# Patient Record
Sex: FEMALE | Race: WHITE | Employment: OTHER | URBAN - METROPOLITAN AREA
[De-identification: names, ages, dates, MRNs, and addresses within clinical notes are randomized per-mention and may not be internally consistent; named-entity substitution may affect disease eponyms.]

---

## 2018-10-12 ENCOUNTER — HOSPITAL ENCOUNTER (OUTPATIENT)
Dept: GENERAL RADIOLOGY | Age: 83
Discharge: HOME | End: 2018-10-12
Payer: MEDICARE

## 2018-10-12 ENCOUNTER — HOSPITAL ENCOUNTER (OUTPATIENT)
Dept: PREADMISSION TESTING | Age: 83
Discharge: HOME | End: 2018-10-12
Payer: MEDICARE

## 2018-10-12 VITALS
DIASTOLIC BLOOD PRESSURE: 74 MMHG | HEIGHT: 57 IN | BODY MASS INDEX: 28.05 KG/M2 | SYSTOLIC BLOOD PRESSURE: 147 MMHG | WEIGHT: 130 LBS | HEART RATE: 96 BPM | TEMPERATURE: 97.9 F

## 2018-10-12 DIAGNOSIS — Z01.818 PRE-OP TESTING: ICD-10-CM

## 2018-10-12 LAB
ALBUMIN SERPL-MCNC: 3.5 G/DL (ref 3.5–5)
ALBUMIN/GLOB SERPL: 1.1 {RATIO} (ref 1.1–2.2)
ALP SERPL-CCNC: 84 U/L (ref 45–117)
ALT SERPL-CCNC: 24 U/L (ref 12–78)
ANION GAP SERPL CALC-SCNC: 6 MMOL/L (ref 5–15)
AST SERPL-CCNC: 19 U/L (ref 15–37)
BASOPHILS # BLD: 0.1 K/UL (ref 0–0.1)
BASOPHILS NFR BLD: 1 % (ref 0–1)
BILIRUB SERPL-MCNC: 0.4 MG/DL (ref 0.2–1)
BUN SERPL-MCNC: 22 MG/DL (ref 6–20)
BUN/CREAT SERPL: 18 (ref 12–20)
CALCIUM SERPL-MCNC: 9.4 MG/DL (ref 8.5–10.1)
CHLORIDE SERPL-SCNC: 105 MMOL/L (ref 97–108)
CO2 SERPL-SCNC: 30 MMOL/L (ref 21–32)
CREAT SERPL-MCNC: 1.24 MG/DL (ref 0.55–1.02)
DIFFERENTIAL METHOD BLD: ABNORMAL
EOSINOPHIL # BLD: 0.1 K/UL (ref 0–0.4)
EOSINOPHIL NFR BLD: 2 % (ref 0–7)
ERYTHROCYTE [DISTWIDTH] IN BLOOD BY AUTOMATED COUNT: 13.7 % (ref 11.5–14.5)
GLOBULIN SER CALC-MCNC: 3.1 G/DL (ref 2–4)
GLUCOSE SERPL-MCNC: 91 MG/DL (ref 65–100)
HCT VFR BLD AUTO: 35.9 % (ref 35–47)
HGB BLD-MCNC: 11.2 G/DL (ref 11.5–16)
IMM GRANULOCYTES # BLD: 0.1 K/UL (ref 0–0.04)
IMM GRANULOCYTES NFR BLD AUTO: 1 % (ref 0–0.5)
LYMPHOCYTES # BLD: 1.8 K/UL (ref 0.8–3.5)
LYMPHOCYTES NFR BLD: 21 % (ref 12–49)
MCH RBC QN AUTO: 31.4 PG (ref 26–34)
MCHC RBC AUTO-ENTMCNC: 31.2 G/DL (ref 30–36.5)
MCV RBC AUTO: 100.6 FL (ref 80–99)
MONOCYTES # BLD: 1 K/UL (ref 0–1)
MONOCYTES NFR BLD: 12 % (ref 5–13)
NEUTS SEG # BLD: 5.3 K/UL (ref 1.8–8)
NEUTS SEG NFR BLD: 64 % (ref 32–75)
NRBC # BLD: 0 K/UL (ref 0–0.01)
NRBC BLD-RTO: 0 PER 100 WBC
PLATELET # BLD AUTO: 256 K/UL (ref 150–400)
PMV BLD AUTO: 10.6 FL (ref 8.9–12.9)
POTASSIUM SERPL-SCNC: 4.7 MMOL/L (ref 3.5–5.1)
PROT SERPL-MCNC: 6.6 G/DL (ref 6.4–8.2)
RBC # BLD AUTO: 3.57 M/UL (ref 3.8–5.2)
SODIUM SERPL-SCNC: 141 MMOL/L (ref 136–145)
WBC # BLD AUTO: 8.2 K/UL (ref 3.6–11)

## 2018-10-12 PROCEDURE — 71046 X-RAY EXAM CHEST 2 VIEWS: CPT

## 2018-10-12 PROCEDURE — 85025 COMPLETE CBC W/AUTO DIFF WBC: CPT | Performed by: SPECIALIST

## 2018-10-12 PROCEDURE — 80053 COMPREHEN METABOLIC PANEL: CPT | Performed by: SPECIALIST

## 2018-10-12 PROCEDURE — 36415 COLL VENOUS BLD VENIPUNCTURE: CPT | Performed by: SPECIALIST

## 2018-10-12 RX ORDER — FOLIC ACID 1 MG/1
TABLET ORAL DAILY
COMMUNITY

## 2018-10-12 RX ORDER — CYANOCOBALAMIN 1000 UG/ML
1000 INJECTION, SOLUTION INTRAMUSCULAR; SUBCUTANEOUS
COMMUNITY

## 2018-10-12 RX ORDER — GLUCOSAMINE HCL 500 MG
TABLET ORAL
COMMUNITY

## 2018-10-12 RX ORDER — AA/PROT/LYSINE/METHIO/VIT C/B6 50-12.5 MG
TABLET ORAL
COMMUNITY

## 2018-10-12 NOTE — PERIOP NOTES
PATIENT GIVEN WRITTEN INSTRUCTIONS TO GO TO THE IMAGING CENTER/CANCER CENTER [address] [facility] [address] TO HAVE CHEST XRAY COMPLETED. Patient verbalizes understanding of preoperative instructions:  NPO AFTER MN .

## 2018-10-23 ENCOUNTER — ANESTHESIA EVENT (OUTPATIENT)
Dept: SURGERY | Age: 83
End: 2018-10-23
Payer: MEDICARE

## 2018-10-23 ENCOUNTER — HOSPITAL ENCOUNTER (OUTPATIENT)
Age: 83
Setting detail: OUTPATIENT SURGERY
Discharge: HOME | End: 2018-10-23
Attending: SPECIALIST | Admitting: SPECIALIST
Payer: MEDICARE

## 2018-10-23 ENCOUNTER — ANESTHESIA (OUTPATIENT)
Dept: SURGERY | Age: 83
End: 2018-10-23
Payer: MEDICARE

## 2018-10-23 VITALS
BODY MASS INDEX: 28.05 KG/M2 | WEIGHT: 130 LBS | OXYGEN SATURATION: 95 % | HEART RATE: 72 BPM | TEMPERATURE: 97.3 F | DIASTOLIC BLOOD PRESSURE: 54 MMHG | SYSTOLIC BLOOD PRESSURE: 152 MMHG | RESPIRATION RATE: 12 BRPM | HEIGHT: 57 IN

## 2018-10-23 DIAGNOSIS — I10 HYPERTENSION, UNSPECIFIED TYPE: Primary | ICD-10-CM

## 2018-10-23 PROCEDURE — 77030008570 HC TBNG SUC IRR GRAC -B: Performed by: SPECIALIST

## 2018-10-23 PROCEDURE — 74011250637 HC RX REV CODE- 250/637: Performed by: SPECIALIST

## 2018-10-23 PROCEDURE — 74011000272 HC RX REV CODE- 272: Performed by: SPECIALIST

## 2018-10-23 PROCEDURE — 77030018836 HC SOL IRR NACL ICUM -A: Performed by: SPECIALIST

## 2018-10-23 PROCEDURE — 74011250636 HC RX REV CODE- 250/636

## 2018-10-23 PROCEDURE — 76210000021 HC REC RM PH II 0.5 TO 1 HR: Performed by: SPECIALIST

## 2018-10-23 PROCEDURE — 74011000250 HC RX REV CODE- 250

## 2018-10-23 PROCEDURE — 77030032490 HC SLV COMPR SCD KNE COVD -B: Performed by: SPECIALIST

## 2018-10-23 PROCEDURE — 76060000033 HC ANESTHESIA 1 TO 1.5 HR: Performed by: SPECIALIST

## 2018-10-23 PROCEDURE — 77030020782 HC GWN BAIR PAWS FLX 3M -B

## 2018-10-23 PROCEDURE — 76010000149 HC OR TIME 1 TO 1.5 HR: Performed by: SPECIALIST

## 2018-10-23 PROCEDURE — 77030002974 HC SUT PLN J&J -A: Performed by: SPECIALIST

## 2018-10-23 PROCEDURE — 77030018846 HC SOL IRR STRL H20 ICUM -A: Performed by: SPECIALIST

## 2018-10-23 PROCEDURE — 76210000063 HC OR PH I REC FIRST 0.5 HR: Performed by: SPECIALIST

## 2018-10-23 PROCEDURE — 77030026438 HC STYL ET INTUB CARD -A: Performed by: ANESTHESIOLOGY

## 2018-10-23 PROCEDURE — 77030006689 HC BLD OPHTH BVR BD -A: Performed by: SPECIALIST

## 2018-10-23 PROCEDURE — 74011000250 HC RX REV CODE- 250: Performed by: SPECIALIST

## 2018-10-23 PROCEDURE — 74011250636 HC RX REV CODE- 250/636: Performed by: ANESTHESIOLOGY

## 2018-10-23 PROCEDURE — 77030008684 HC TU ET CUF COVD -B: Performed by: ANESTHESIOLOGY

## 2018-10-23 PROCEDURE — 77030006932 HC BLD TYMP BVR BD -B: Performed by: SPECIALIST

## 2018-10-23 RX ORDER — SODIUM CHLORIDE 9 MG/ML
1000 INJECTION, SOLUTION INTRAVENOUS CONTINUOUS
Status: DISCONTINUED | OUTPATIENT
Start: 2018-10-23 | End: 2018-10-23 | Stop reason: HOSPADM

## 2018-10-23 RX ORDER — FENTANYL CITRATE 50 UG/ML
INJECTION, SOLUTION INTRAMUSCULAR; INTRAVENOUS AS NEEDED
Status: DISCONTINUED | OUTPATIENT
Start: 2018-10-23 | End: 2018-10-23 | Stop reason: HOSPADM

## 2018-10-23 RX ORDER — CIPROFLOXACIN HYDROCHLORIDE 3.5 MG/ML
5 SOLUTION/ DROPS TOPICAL 2 TIMES DAILY
Qty: 10 ML | Refills: 5 | Status: SHIPPED | OUTPATIENT
Start: 2018-10-23 | End: 2019-12-10

## 2018-10-23 RX ORDER — EPINEPHRINE NASAL SOLUTION 1 MG/ML
SOLUTION NASAL AS NEEDED
Status: DISCONTINUED | OUTPATIENT
Start: 2018-10-23 | End: 2018-10-23 | Stop reason: HOSPADM

## 2018-10-23 RX ORDER — SODIUM CHLORIDE, SODIUM LACTATE, POTASSIUM CHLORIDE, CALCIUM CHLORIDE 600; 310; 30; 20 MG/100ML; MG/100ML; MG/100ML; MG/100ML
INJECTION, SOLUTION INTRAVENOUS
Status: DISCONTINUED | OUTPATIENT
Start: 2018-10-23 | End: 2018-10-23 | Stop reason: HOSPADM

## 2018-10-23 RX ORDER — MIDAZOLAM HYDROCHLORIDE 1 MG/ML
1 INJECTION, SOLUTION INTRAMUSCULAR; INTRAVENOUS AS NEEDED
Status: DISCONTINUED | OUTPATIENT
Start: 2018-10-23 | End: 2018-10-23 | Stop reason: HOSPADM

## 2018-10-23 RX ORDER — SODIUM CHLORIDE 0.9 % (FLUSH) 0.9 %
5-10 SYRINGE (ML) INJECTION AS NEEDED
Status: DISCONTINUED | OUTPATIENT
Start: 2018-10-23 | End: 2018-10-23 | Stop reason: HOSPADM

## 2018-10-23 RX ORDER — EPINEPHRINE NASAL SOLUTION 1 MG/ML
2 SOLUTION NASAL
Status: DISCONTINUED | OUTPATIENT
Start: 2018-10-23 | End: 2018-10-23 | Stop reason: HOSPADM

## 2018-10-23 RX ORDER — OXYCODONE AND ACETAMINOPHEN 5; 325 MG/1; MG/1
1 TABLET ORAL AS NEEDED
Status: DISCONTINUED | OUTPATIENT
Start: 2018-10-23 | End: 2018-10-23 | Stop reason: HOSPADM

## 2018-10-23 RX ORDER — BACITRACIN 500 [USP'U]/G
OINTMENT TOPICAL
Status: DISCONTINUED | OUTPATIENT
Start: 2018-10-23 | End: 2018-10-23 | Stop reason: HOSPADM

## 2018-10-23 RX ORDER — ONDANSETRON 2 MG/ML
4 INJECTION INTRAMUSCULAR; INTRAVENOUS AS NEEDED
Status: DISCONTINUED | OUTPATIENT
Start: 2018-10-23 | End: 2018-10-23 | Stop reason: HOSPADM

## 2018-10-23 RX ORDER — LIDOCAINE HYDROCHLORIDE 20 MG/ML
INJECTION, SOLUTION EPIDURAL; INFILTRATION; INTRACAUDAL; PERINEURAL AS NEEDED
Status: DISCONTINUED | OUTPATIENT
Start: 2018-10-23 | End: 2018-10-23 | Stop reason: HOSPADM

## 2018-10-23 RX ORDER — CLINDAMYCIN PHOSPHATE 900 MG/50ML
900 INJECTION INTRAVENOUS
Status: DISCONTINUED | OUTPATIENT
Start: 2018-10-23 | End: 2018-10-23 | Stop reason: HOSPADM

## 2018-10-23 RX ORDER — CLINDAMYCIN HYDROCHLORIDE 150 MG/1
300 CAPSULE ORAL 3 TIMES DAILY
Qty: 30 CAP | Refills: 0 | Status: SHIPPED | OUTPATIENT
Start: 2018-10-23 | End: 2018-10-28

## 2018-10-23 RX ORDER — FENTANYL CITRATE 50 UG/ML
25 INJECTION, SOLUTION INTRAMUSCULAR; INTRAVENOUS
Status: DISCONTINUED | OUTPATIENT
Start: 2018-10-23 | End: 2018-10-23 | Stop reason: HOSPADM

## 2018-10-23 RX ORDER — LIDOCAINE HYDROCHLORIDE AND EPINEPHRINE 10; 10 MG/ML; UG/ML
1.5 INJECTION, SOLUTION INFILTRATION; PERINEURAL ONCE
Status: DISCONTINUED | OUTPATIENT
Start: 2018-10-23 | End: 2018-10-23 | Stop reason: HOSPADM

## 2018-10-23 RX ORDER — SODIUM CHLORIDE, SODIUM LACTATE, POTASSIUM CHLORIDE, CALCIUM CHLORIDE 600; 310; 30; 20 MG/100ML; MG/100ML; MG/100ML; MG/100ML
125 INJECTION, SOLUTION INTRAVENOUS CONTINUOUS
Status: DISCONTINUED | OUTPATIENT
Start: 2018-10-23 | End: 2018-10-23 | Stop reason: HOSPADM

## 2018-10-23 RX ORDER — OFLOXACIN 3 MG/ML
SOLUTION/ DROPS OPHTHALMIC AS NEEDED
Status: DISCONTINUED | OUTPATIENT
Start: 2018-10-23 | End: 2018-10-23 | Stop reason: HOSPADM

## 2018-10-23 RX ORDER — PROPOFOL 10 MG/ML
INJECTION, EMULSION INTRAVENOUS AS NEEDED
Status: DISCONTINUED | OUTPATIENT
Start: 2018-10-23 | End: 2018-10-23 | Stop reason: HOSPADM

## 2018-10-23 RX ORDER — FENTANYL CITRATE 50 UG/ML
50 INJECTION, SOLUTION INTRAMUSCULAR; INTRAVENOUS AS NEEDED
Status: DISCONTINUED | OUTPATIENT
Start: 2018-10-23 | End: 2018-10-23 | Stop reason: HOSPADM

## 2018-10-23 RX ORDER — SODIUM CHLORIDE 0.9 % (FLUSH) 0.9 %
5-10 SYRINGE (ML) INJECTION EVERY 8 HOURS
Status: DISCONTINUED | OUTPATIENT
Start: 2018-10-23 | End: 2018-10-23 | Stop reason: HOSPADM

## 2018-10-23 RX ORDER — DEXAMETHASONE SODIUM PHOSPHATE 4 MG/ML
INJECTION, SOLUTION INTRA-ARTICULAR; INTRALESIONAL; INTRAMUSCULAR; INTRAVENOUS; SOFT TISSUE AS NEEDED
Status: DISCONTINUED | OUTPATIENT
Start: 2018-10-23 | End: 2018-10-23 | Stop reason: HOSPADM

## 2018-10-23 RX ORDER — MORPHINE SULFATE 10 MG/ML
2 INJECTION, SOLUTION INTRAMUSCULAR; INTRAVENOUS
Status: DISCONTINUED | OUTPATIENT
Start: 2018-10-23 | End: 2018-10-23 | Stop reason: HOSPADM

## 2018-10-23 RX ORDER — HYDROCODONE BITARTRATE AND ACETAMINOPHEN 5; 325 MG/1; MG/1
1-2 TABLET ORAL
Qty: 20 TAB | Refills: 0 | Status: SHIPPED | OUTPATIENT
Start: 2018-10-23

## 2018-10-23 RX ORDER — ONDANSETRON 2 MG/ML
INJECTION INTRAMUSCULAR; INTRAVENOUS AS NEEDED
Status: DISCONTINUED | OUTPATIENT
Start: 2018-10-23 | End: 2018-10-23 | Stop reason: HOSPADM

## 2018-10-23 RX ORDER — MIDAZOLAM HYDROCHLORIDE 1 MG/ML
0.5 INJECTION, SOLUTION INTRAMUSCULAR; INTRAVENOUS
Status: DISCONTINUED | OUTPATIENT
Start: 2018-10-23 | End: 2018-10-23 | Stop reason: HOSPADM

## 2018-10-23 RX ORDER — LIDOCAINE HYDROCHLORIDE AND EPINEPHRINE 10; 10 MG/ML; UG/ML
INJECTION, SOLUTION INFILTRATION; PERINEURAL AS NEEDED
Status: DISCONTINUED | OUTPATIENT
Start: 2018-10-23 | End: 2018-10-23 | Stop reason: HOSPADM

## 2018-10-23 RX ORDER — ESMOLOL HYDROCHLORIDE 10 MG/ML
INJECTION INTRAVENOUS AS NEEDED
Status: DISCONTINUED | OUTPATIENT
Start: 2018-10-23 | End: 2018-10-23 | Stop reason: HOSPADM

## 2018-10-23 RX ORDER — DIPHENHYDRAMINE HYDROCHLORIDE 50 MG/ML
12.5 INJECTION, SOLUTION INTRAMUSCULAR; INTRAVENOUS AS NEEDED
Status: DISCONTINUED | OUTPATIENT
Start: 2018-10-23 | End: 2018-10-23 | Stop reason: HOSPADM

## 2018-10-23 RX ORDER — ROCURONIUM BROMIDE 10 MG/ML
INJECTION, SOLUTION INTRAVENOUS AS NEEDED
Status: DISCONTINUED | OUTPATIENT
Start: 2018-10-23 | End: 2018-10-23 | Stop reason: HOSPADM

## 2018-10-23 RX ORDER — SODIUM CHLORIDE 9 MG/ML
50 INJECTION, SOLUTION INTRAVENOUS CONTINUOUS
Status: DISCONTINUED | OUTPATIENT
Start: 2018-10-23 | End: 2018-10-23 | Stop reason: HOSPADM

## 2018-10-23 RX ORDER — SUCCINYLCHOLINE CHLORIDE 20 MG/ML
INJECTION INTRAMUSCULAR; INTRAVENOUS AS NEEDED
Status: DISCONTINUED | OUTPATIENT
Start: 2018-10-23 | End: 2018-10-23 | Stop reason: HOSPADM

## 2018-10-23 RX ORDER — LIDOCAINE HYDROCHLORIDE 10 MG/ML
0.1 INJECTION, SOLUTION EPIDURAL; INFILTRATION; INTRACAUDAL; PERINEURAL AS NEEDED
Status: DISCONTINUED | OUTPATIENT
Start: 2018-10-23 | End: 2018-10-23 | Stop reason: HOSPADM

## 2018-10-23 RX ORDER — SPIRONOLACTONE 25 MG/1
12.5 TABLET ORAL DAILY
COMMUNITY

## 2018-10-23 RX ORDER — BACITRACIN 500 [USP'U]/G
OINTMENT TOPICAL AS NEEDED
Status: DISCONTINUED | OUTPATIENT
Start: 2018-10-23 | End: 2018-10-23 | Stop reason: HOSPADM

## 2018-10-23 RX ADMIN — ROCURONIUM BROMIDE 10 MG: 10 INJECTION, SOLUTION INTRAVENOUS at 07:34

## 2018-10-23 RX ADMIN — SODIUM CHLORIDE, POTASSIUM CHLORIDE, SODIUM LACTATE AND CALCIUM CHLORIDE 125 ML/HR: 600; 310; 30; 20 INJECTION, SOLUTION INTRAVENOUS at 07:20

## 2018-10-23 RX ADMIN — ONDANSETRON 4 MG: 2 INJECTION INTRAMUSCULAR; INTRAVENOUS at 08:09

## 2018-10-23 RX ADMIN — FENTANYL CITRATE 50 MCG: 50 INJECTION, SOLUTION INTRAMUSCULAR; INTRAVENOUS at 07:23

## 2018-10-23 RX ADMIN — SUCCINYLCHOLINE CHLORIDE 80 MG: 20 INJECTION INTRAMUSCULAR; INTRAVENOUS at 07:34

## 2018-10-23 RX ADMIN — PROPOFOL 80 MG: 10 INJECTION, EMULSION INTRAVENOUS at 07:34

## 2018-10-23 RX ADMIN — FENTANYL CITRATE 25 MCG: 50 INJECTION, SOLUTION INTRAMUSCULAR; INTRAVENOUS at 07:32

## 2018-10-23 RX ADMIN — ESMOLOL HYDROCHLORIDE 10 MG: 10 INJECTION INTRAVENOUS at 08:17

## 2018-10-23 RX ADMIN — FENTANYL CITRATE 25 MCG: 50 INJECTION, SOLUTION INTRAMUSCULAR; INTRAVENOUS at 07:30

## 2018-10-23 RX ADMIN — ESMOLOL HYDROCHLORIDE 10 MG: 10 INJECTION INTRAVENOUS at 08:20

## 2018-10-23 RX ADMIN — DEXAMETHASONE SODIUM PHOSPHATE 8 MG: 4 INJECTION, SOLUTION INTRA-ARTICULAR; INTRALESIONAL; INTRAMUSCULAR; INTRAVENOUS; SOFT TISSUE at 07:34

## 2018-10-23 RX ADMIN — ESMOLOL HYDROCHLORIDE 10 MG: 10 INJECTION INTRAVENOUS at 08:19

## 2018-10-23 RX ADMIN — LIDOCAINE HYDROCHLORIDE 60 MG: 20 INJECTION, SOLUTION EPIDURAL; INFILTRATION; INTRACAUDAL; PERINEURAL at 07:34

## 2018-10-23 RX ADMIN — ESMOLOL HYDROCHLORIDE 10 MG: 10 INJECTION INTRAVENOUS at 08:22

## 2018-10-23 RX ADMIN — SODIUM CHLORIDE, SODIUM LACTATE, POTASSIUM CHLORIDE, CALCIUM CHLORIDE: 600; 310; 30; 20 INJECTION, SOLUTION INTRAVENOUS at 07:34

## 2018-10-23 RX ADMIN — ESMOLOL HYDROCHLORIDE 10 MG: 10 INJECTION INTRAVENOUS at 08:21

## 2018-10-23 NOTE — OP NOTES
OPERATIVE REPORT     PREOPERATIVE DIAGNOSIS: Right chronic otitis media. POSTOPERATIVE DIAGNOSIS: Right chronic otitis media. OPERATIVE PROCEDURES:   1. Right EnduralTympanoplasty   2. Cartilage graft through a separate incision. 3. Facial nerve monitoring x1 hour. 4. Placement of facial nerve electrodes. 5. Microdissection. SURGEON: Razia Berry MD     ESTIMATED BLOOD LOSS: 5 mL. SPECIMEN: None    ANESTHESIA: General.     COMPLICATIONS: None. Implant : none     FINDINGS:     1. Normal Ossicular chain  2. Normal middle ear space. 3.  50% posterior perforation    DESCRIPTION OF PROCEDURE:     After the patient received informed consent, the patient  was taken to the operating room. The patient was kept in the supine   position, was prepped and draped in the usual fashion. After the   administration of general anesthesia, the table was turned 180 degrees away   from the neutral position. The operating microscope was used for the entire case. 10 cc lidocaine with epinephrine was injected into the right pre and post auricular sites. The Right ear was   examined A tragal incision was made. Deeper layer of tissue was incised and   a 1 x 1 cm piece of tragal cartilage was harvested and placed on a Garland   block. This incision was then closed using a chromic stitch. The eardrum was visualized and 50% posterior perforation and the edges were revitalized using sharp dissection. Next, using a   speculum lowe, a tympanomeatal flap was constructed and raised. The   middle ear was entered. The chorda tympani was identified and    preserved. The middle ear was entered. Next, 30 minutes took place toward dissecting the tympanomeatal flap and   preserving the chorda tympani nerve. The ossicular chain was palpated and found to be mobile.  The cartilage graft, which was harvested earlier, was then placed lateral to a bed of cipro gel foam that was placed in the middle ear and closed the perforation. The external auditory canal was then packed with Ciprodex and Gelfoam after the tympanomeatal flap was reapproximated. All counts were correct at the end of the case. The patient tolerated the procedure well and went to PACU   in stable condition.        Munira Luna MD   cc: Alia Lee MD

## 2018-10-23 NOTE — BRIEF OP NOTE
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 
Circ-1: Kaden Zayas RN Scrub RN-1: Lora Rodrigues RN Event Time In Time Out Incision Start 8713 Incision Close 0813 Anesthesia: General  
Estimated Blood Loss: 2 cc Specimens: * No specimens in log * Findings: perforation Complications: none Implants: * No implants in log *

## 2018-10-23 NOTE — DISCHARGE INSTRUCTIONS
[facility]  POSTOPERATIVE INSTRUCTIONS  FOR PATIENTS UNDERGOING  CHRONIC EAR, MIDDLE EAR OR STAPES SURGERY    1. Do not blow your nose for three weeks following surgery. If you sneeze or cough do so with your mouth open. 2. Avoid any heavy lifting (over 10 lbs.), straining or bending for three weeks following surgery. 3. Keep your head elevated as much as possible x 48 hours . Sleep and rest on two to three pillows if possible. 4. Do not get water in your ear. If showering or washing your hair place a piece of cotton coated in Vaseline in the ear canal to seal it. If there is a separate incision keep this dry x 48 hours. 5. If you wear glasses either remove the arm on the operated side or make certain that it does not rest on the incision behind your ear for one week. 6. Beginning one day after surgery try to leave the cotton out of our ear as much as possible unless there is significant drainage. 7. Some drainage from your ear canal may occur after surgery. If there is a separate incision some drainage may occur from this area also. If the drainage is profuse or develops a foul odor please call. 8. Popping sounds, a plugged sensation, ringing or fluctuating hearing may be noticed in the ear during the healing. 9. Avoid travel by air for three weeks following surgery. 10. If you should notice any swelling, redness or excessive pain please call. 11. Some dizziness may occur after surgery. If it becomes severe or is associated with nausea or vomiting please call. 12. Please call The [address] [place] [address] [place]. to make an appointment to be seen 7-10 days after the time of your surgery unless stated otherwise by your physician. I understand and acknowledge receipt of the instructions indicated above.                                                                                                                                            Physician's or R.N.'s Signature Date/Time                                                                                                                                           Patient or Representative Signature                                                          Date/Time          [address] M.D.  [address]  [phone]    ______________________________________________________________________    Anesthesia Discharge Instructions    After general anesthesia or intervenous sedation, for 24 hours or while taking prescription Narcotics:  · Limit your activities  · Do not drive or operate hazardous machinery  · If you have not urinated within 8 hours after discharge, please contact your surgeon on call. · Do not make important personal or business decisions  · Do not drink alcoholic beverages    Report the following to your surgeon:  · Excessive pain, swelling, redness or odor of or around the surgical area  · Temperature over 100.5 degrees  · Nausea and vomiting lasting longer than 4 hours or if unable to take medication  · Any signs of decreased circulation or nerve impairment to extremity:  Change in color, persistent numbness, tingling, coldness or increased pain.   · Any questions

## 2018-10-23 NOTE — ANESTHESIA PREPROCEDURE EVALUATION
Anesthetic History No history of anesthetic complications Review of Systems / Medical History Patient summary reviewed, nursing notes reviewed and pertinent labs reviewed Pulmonary Within defined limits Sleep apnea Neuro/Psych Within defined limits Cardiovascular Within defined limits Dysrhythmias GI/Hepatic/Renal 
Within defined limits GERD Endo/Other Within defined limits Hypothyroidism Arthritis Other Findings Physical Exam 
 
Airway Mallampati: II 
TM Distance: > 6 cm Neck ROM: normal range of motion Mouth opening: Normal 
 
 Cardiovascular Regular rate and rhythm,  S1 and S2 normal,  no murmur, click, rub, or gallop Dental 
No notable dental hx Pulmonary Breath sounds clear to auscultation Abdominal 
GI exam deferred Other Findings Anesthetic Plan ASA: 3 Anesthesia type: general 
 
 
 
 
Induction: Intravenous Anesthetic plan and risks discussed with: Patient

## 2018-10-23 NOTE — ANESTHESIA POSTPROCEDURE EVALUATION
Procedure(s): RIGHT TYMPANOPLASTY, CARTILAGE GRAFT. Anesthesia Post Evaluation Multimodal analgesia: multimodal analgesia used between 6 hours prior to anesthesia start to PACU discharge Patient location during evaluation: PACU Patient participation: complete - patient participated Level of consciousness: awake Pain management: adequate Airway patency: patent Anesthetic complications: no 
Cardiovascular status: acceptable Respiratory status: acceptable Hydration status: acceptable Visit Vitals /56 Pulse 70 Temp 36.4 °C (97.6 °F) Resp 12 Ht 4' 9\" (1.448 m) Wt 59 kg (130 lb 0 oz) SpO2 97% BMI 28.13 kg/m²

## 2018-10-23 NOTE — ROUTINE PROCESS
Patient: Karlos Washington MRN: [id number]  SSN: [id number] YOB: 1927  Age: 80 y.o. Sex: female Patient is status post Procedure(s): RIGHT TYMPANOPLASTY, CARTILAGE GRAFT. Surgeon(s) and Role: Emiliana Ortiz MD - Primary Local/Dose/Irrigation:  10 ml 1% lidocaine with epinephrine was injected into patient's right ear Saline irrigation to sterile field. Peripheral IV 10/23/18 Anterior; Left Antecubital (Active) Site Assessment Clean, dry, & intact 10/23/2018  7:24 AM  
Phlebitis Assessment 0 10/23/2018  7:24 AM  
Infiltration Assessment 0 10/23/2018  7:24 AM  
Dressing Status Clean, dry, & intact; New 10/23/2018  7:24 AM  
Dressing Type Tape;Transparent 10/23/2018  7:24 AM  
Hub Color/Line Status Blue; Infusing 10/23/2018  7:24 AM  
                 
 
 
 
Dressing/Packing:  Wound Elbow Right-DRESSING TYPE: Cotton ball(s) (10/23/18 0816) Splint/Cast:  ] Other:  Patient has a wound on her write wrist, band-aid on left forearm, and dressing on her left knee prior to surgery Patient's glasses were sent with her to recovery 12:43 PM 
COMPUTER DOCUMENTED AN ERROR: WOUND WAS A RIGHT EAR NOT RIGHT ELBOW

## 2018-10-23 NOTE — PERIOP NOTES
7:52 AM 
UNABLE TO SPEAK TO PATIENT'S SON (ADAM) TO UPDATE HIM ON PATIENT'S SURGICAL STATUS. THIS NURSE LEFT A MESSAGE WITH THE SURGICAL WAITING AREA VOLUNTEER.

## 2019-12-03 ENCOUNTER — HOSPITAL ENCOUNTER (OUTPATIENT)
Dept: WOUND CARE | Age: 84
Discharge: HOME | End: 2019-12-03
Payer: MEDICARE

## 2019-12-03 VITALS
BODY MASS INDEX: 28.13 KG/M2 | SYSTOLIC BLOOD PRESSURE: 177 MMHG | RESPIRATION RATE: 15 BRPM | HEIGHT: 58 IN | DIASTOLIC BLOOD PRESSURE: 74 MMHG | HEART RATE: 95 BPM | TEMPERATURE: 96.6 F | WEIGHT: 134 LBS

## 2019-12-03 PROBLEM — L97.219 VARICOSE VEINS OF RIGHT LOWER EXTREMITY WITH ULCER OF CALF (HCC): Status: ACTIVE | Noted: 2019-12-03

## 2019-12-03 PROBLEM — I87.2 PERIPHERAL VENOUS INSUFFICIENCY: Status: ACTIVE | Noted: 2019-12-03

## 2019-12-03 PROBLEM — I83.012 VARICOSE VEINS OF RIGHT LOWER EXTREMITY WITH ULCER OF CALF (HCC): Status: ACTIVE | Noted: 2019-12-03

## 2019-12-03 PROCEDURE — 11042 DBRDMT SUBQ TIS 1ST 20SQCM/<: CPT

## 2019-12-03 PROCEDURE — 74011000250 HC RX REV CODE- 250: Performed by: PODIATRIST

## 2019-12-03 RX ORDER — METHSCOPOLAMINE BROMIDE 2.5 MG/1
TABLET ORAL
COMMUNITY

## 2019-12-03 RX ORDER — CEFUROXIME AXETIL 500 MG/1
500 TABLET ORAL 2 TIMES DAILY
COMMUNITY
Start: 2019-12-02 | End: 2019-12-12

## 2019-12-03 RX ADMIN — Medication: at 09:16

## 2019-12-03 NOTE — WOUND CARE
Wound Center History and Physical 
 
Subjective: Chief Complaint: 
Lyn Graham is a @AGE female who presents with R lower leg lateral wound of months months duration. Referred by:  Sadia Issa 
 
HPI:  
Wound caused by: acute injury due to blister or hitting leg Current wound care: 
Offloading wound: no Appetite: good Wound associated pain: moderate Diabetic: - 
Smoker: - 
ROS: no N/V, no T/chills; no local rash Past Medical History:  
Diagnosis Date  Arrhythmia H/O A-FIB  Arthritis  Cancer [facility])   
 h/o skin cancer on neck, forehead, L and R leg  CKD (chronic kidney disease), stage II   
 Corkscrew esophagus  GERD (gastroesophageal reflux disease)  Heart failure ([facility] [address]  Hyperlipidemia  Hypertension  Hypothyroidism  Ill-defined condition 2015 RIGHT EAR TUBE MILD HEARING LOSS  Sleep apnea WEARS GUARD Past Surgical History:  
Procedure Laterality Date  HX GI    
 1983 gallbladder removed  HX GYN    
 tubal ligation [id number]  HX GYN    
 2009 ovary cyst removed  HX GYN    
 D&C  
 HX HEENT    
 2012 L eye cataract removed  HX HYSTERECTOMY  2009  HX ORTHOPAEDIC    
 R foot surgery  HX OTHER SURGICAL    
 1984 neck cyst removed  HX OTHER SURGICAL    
 1992 cystocele and rectocele  HX OTHER SURGICAL    
 1991 plastic surgery for L and R leg skin cancer  HX PACEMAKER    
 HX UROLOGICAL    
 bladder lift 1961 Family History Problem Relation Age of Onset  Hypertension Mother  Heart Disease Father Social History Tobacco Use  Smoking status: Never Smoker  Smokeless tobacco: Never Used Substance Use Topics  Alcohol use: No  
   
Prior to Admission medications Medication Sig Start Date End Date Taking? Authorizing Provider  
amitriptyline HCl (AMITRIPTYLINE PO) Take  by mouth once. Yes Provider, Historical  
methscopolamine (PAMINE) 2.5 mg tab tablet Take  by mouth.    Yes Provider, Historical  
sennosides (SENNA) 8.6 mg cap Take  by mouth. Yes Provider, Historical  
cefUROXime (CEFTIN) 500 mg tablet Take 500 mg by mouth two (2) times a day. 12/2/19 12/12/19 Yes Provider, Historical  
dilTIAZem ER (CARDIZEM LA) 120 mg tablet Take 180 mg by mouth daily. Provider, Historical  
spironolactone (ALDACTONE) 25 mg tablet Take 12.5 mg by mouth daily. Provider, Historical  
OTHER Take  by mouth daily. Provider, Historical  
dextran 70-hypromellose (TEARS PURE) ophthalmic solution Administer 2 Drops to both eyes as needed. Provider, Historical  
HYDROcodone-acetaminophen (NORCO) 5-325 mg per tablet Take 1-2 Tabs by mouth every four (4) hours as needed for Pain. Max Daily Amount: 12 Tabs. 10/23/18   Conrad Ramirez MD  
ciprofloxacin HCl (CILOXAN) 0.3 % ophthalmic solution 5 Drops by Otic route two (2) times a day. Use in the operated or affected ear twice a day 5 drops 10/23/18   Conrad Ramirez MD  
Cholecalciferol, Vitamin D3, 3,000 unit tab Take  by mouth. Provider, Historical  
folic acid (FOLVITE) 1 mg tablet Take  by mouth daily. Provider, Historical  
vit A/vit C/vit E/zinc/copper (PRESERVISION AREDS PO) Take  by mouth. Provider, Historical  
coenzyme q10 (CO Q-10) 10 mg cap Take  by mouth. Provider, Historical  
B.infantis-B.ani-B.long-B.bifi (PROBIOTIC 4X) 10-15 mg TbEC Take  by mouth. Provider, Historical  
cyanocobalamin (VITAMIN B-12) 1,000 mcg/mL injection 1,000 mcg by IntraMUSCular route. EVERY TWO WEEKS    Provider, Historical  
aspirin 81 mg chewable tablet Take 1 Tab by mouth daily. 3/18/15   Gamal Mesa MD  
simvastatin (ZOCOR) 5 mg tablet Take 5 mg by mouth nightly. Provider, Historical  
simethicone (MYLICON) 80 mg chewable tablet Take 80 mg by mouth every six (6) hours as needed for Flatulence. Provider, Historical  
levothyroxine (SYNTHROID) 75 mcg tablet Take 75 mcg by mouth Daily (before breakfast).     Provider, Historical  
 magnesium 250 mg Tab Take 1 Tab by mouth two (2) times a day. Provider, Historical  
nitroglycerin (NITROSTAT) 0.3 mg SL tablet by SubLINGual route every five (5) minutes as needed. Provider, Historical  
 
Allergies Allergen Reactions  Penicillins Rash  Polymyxin B Sulf-Trimethoprim Other (comments) RED, ITCHY EYES  
 Sulfa (Sulfonamide Antibiotics) Rash Review of Systems: A comprehensive review of systems was negative except for that written in the History of Present Illness. Objective:  
 
Physical Exam:  
  
General: well developed, well nourished, pleasant , NAD. Hygiene good Psych: cooperative. Pleasant. No anxiety or depression. Normal mood and affect. Neuro: alert and oriented to person/place/situation. Otherwise nonfocal. 
HEENT: Normocephalic, atraumatic. EOMI. Conjunctiva clear. No scleral icterus. Neck: Normal range of motion. No masses. Chest: Good air entry bilaterally. Respirations nonlabored Abdomen: Soft, nontender, nondistended, normoactive bowel sounds Lower extremities: color normal; temperature normal. Hair growth is not present. Calves are supple, nontender, approximately equally sized in comparison. Capillary refill <3 sec Focussed Lower Extremity Exam: 
Vascular exam: 
Right lower extremity: moderate  edema, foot /leg, DP pulse : 0 
PT pulse: doppler signal present Nails dystrophic Left lower extremity: moderate  edema, foot /leg, DP pulse : 0 
PT pulse: PT  
Nails dystrophic Ulcer Description:  
Etiology: venous stasis Location: R lower leg lateral 
Measurement: in cm  Pre/post debridement 6.0 x 4.0 x 0.1 / same inc depth to 0.2 / smaller wounds or scraps on dorsal L leg. Ulcer bed: Granular/Healthy Periwound: Edematous Exudate: Moderate amount Serous exudate Data Review: No results found for this or any previous visit (from the past 24 hour(s)). Assessment: 80 y.o. female with R lower leg lateral venous stasis ulcer and L lower leg VSU L leg VSU Plan: Wound debridement + Dressing: Gent/ Sandra / HFB/  Current compresion sock Frequency: every other day. Order for formal JEFERSON's to try and inc compression. Patient understood and agrees with plan. Questions answered. Weekly visits and serial debridements also discussed. Follow up with me in 1 week Signed By: Kamryn Howard DPM   
 December 3, 2019 Krishna Cassidy Ulcer assessment: Due to presence of necrotic tissue within the wound bed, ulcer requires debridement. Procedure: Debridement:  
The indication for debridement was reviewed with patient. Risks of procedure (bleeding, infection, pain) were discussed with patient and consent signed. Questions were answered Subcutaneous excisional debridement Indication: to remove necrotic tissue/ devitalized tissue/ soft eschar/ infected tissue/obtain deep wound culture through subcutaneous layer of wound bed Consent in chart Anesthesia: Topical 2% lidocaine jelly Instrument: 15 Blade,  Residual Necrosis: Present and scored Bleeding: <1ml Hemostasis: Pressure Patient tolerated procedure well Procedural Pain: none Post - procedural pain: none Post debridement measurements: see progress note.  
Surface area debrided: <20 sq. cm

## 2019-12-03 NOTE — WOUND CARE
12/03/19 4465 Wound Leg lower Lateral;Right #1 12/03/19 Date First Assessed/Time First Assessed: 12/03/19 0903   Present on Hospital Admission: Yes  Wound Approximate Age at First Assessment (Weeks): 2 weeks  Primary Wound Type: Trauma  Location: Leg lower  Wound Location Orientation: Lateral;Right  Wound . .. Dressing Status Removed Dressing Type Xeroform;Gauze;Gauze wrap (jaqueline) Non-staged Wound Description Full thickness Wound Length (cm) 6.5 cm Wound Width (cm) 4.2 cm Wound Depth (cm) 0.1 cm Wound Surface Area (cm^2) 27.3 cm^2 Wound Volume (cm^3) 2.73 cm^3 Condition of Base Granulation;Pink Condition of Edges Closed Drainage Amount Moderate Drainage Color Serosanguinous Wound Odor None Elsie-wound Assessment Edema Cleansing and Cleansing Agents  Soap and water Wound Leg lower Left; Anterior #2 12/03/19 Date First Assessed/Time First Assessed: 12/03/19 0904   Present on Hospital Admission: Yes  Wound Approximate Age at First Assessment (Weeks): 2 weeks  Primary Wound Type: Trauma  Location: Leg lower  Wound Location Orientation: Left; Anterior  Wound . .. Dressing Status Removed Dressing Type Xeroform;Gauze;Gauze wrap (jaqueline) Non-staged Wound Description Partial thickness Wound Length (cm) 1.9 cm Wound Width (cm) 1.5 cm Wound Depth (cm) 0.1 cm Wound Surface Area (cm^2) 2.85 cm^2 Wound Volume (cm^3) 0.28 cm^3 Condition of Base Pink;Slough Condition of Edges Closed Assessment Edema Drainage Amount Small Drainage Color Serosanguinous Wound Odor None Elsie-wound Assessment Edema Cleansing and Cleansing Agents  Soap and water Due to network connection error unable to obtain and upload image

## 2019-12-03 NOTE — WOUND CARE
12/03/19 1014 Wound Leg lower Lateral;Right #1 12/03/19 Date First Assessed/Time First Assessed: 12/03/19 0903   Present on Hospital Admission: Yes  Wound Approximate Age at First Assessment (Weeks): 3 weeks  Primary Wound Type: Trauma  Location: Leg lower  Wound Location Orientation: Lateral;Right  Wound . .. Dressing Type Applied Collagens/cell matrix;ABD pad;Gauze;Gauze wrap (jaqueline) (Gentamicin, HBR) Wound Leg lower Left; Anterior #2 12/03/19 Date First Assessed/Time First Assessed: 12/03/19 0904   Present on Hospital Admission: Yes  Wound Approximate Age at First Assessment (Weeks): 3 weeks  Primary Wound Type: Trauma  Location: Leg lower  Wound Location Orientation: Left; Anterior  Wound . .. Dressing Type Applied Xeroform;Gauze;Gauze wrap (jaqueline) Discharge Condition: Stable Pain: 0 Ambulatory Status: [place] Discharge Destination: Home Transportation: Car Accompanied by: Family/Caregiver Discharge instructions reviewed with Patient and copy or written instructions have been provided. All questions/concerns have been addressed at this time.

## 2019-12-10 ENCOUNTER — HOSPITAL ENCOUNTER (OUTPATIENT)
Dept: WOUND CARE | Age: 84
Discharge: HOME | End: 2019-12-10
Payer: MEDICARE

## 2019-12-10 VITALS
TEMPERATURE: 97.3 F | SYSTOLIC BLOOD PRESSURE: 170 MMHG | HEART RATE: 83 BPM | DIASTOLIC BLOOD PRESSURE: 71 MMHG | RESPIRATION RATE: 16 BRPM

## 2019-12-10 PROCEDURE — 11042 DBRDMT SUBQ TIS 1ST 20SQCM/<: CPT

## 2019-12-10 PROCEDURE — 74011000250 HC RX REV CODE- 250: Performed by: PODIATRIST

## 2019-12-10 RX ADMIN — Medication: at 11:18

## 2019-12-10 NOTE — DISCHARGE INSTRUCTIONS
Discharge Instructions for  [facility]  [address] [place], [address] [facility] [address]  Telephone: [phone]    NAME:  Navdeep Chamorro  YOB: 1927  DATE:  12/3/2019    [] Wound and dressing supply provider: {MADHAVI provider:[id number]}    [x] Home Healthcare: Patient to call with number    Wound Cleansing:   Do not scrub or use excessive force. Cleanse wound prior to applying a clean dressing with:    [x] Normal Saline   [] Keep Wound Dry in Shower      [] Wound Cleanser   [] Cleanse wound with Mild Soap & Water    [] May Shower at Discharge   [] Do not shower  [x] cleanse with baby shampoo and rinse      Topical Treatments:  Do not apply lotions, creams, or ointments to wound bed unless directed.      [] Apply moisturizing lotion {laureano lotions :36379} to skin surrounding the wound prior to dressing change.  [] Bactroban/Mupirocin  [x] Gentamicin ointment to right leg wound   [] Other: gentian violet to wound bed and periwound  [] Other:         Dressings:           Wound Location Left lower leg three times a week     Apply Primary Dressing:      [] MediHoney Gel [] Medihoney CA Alginate    [] Alginate with Silver (Aquacel AG) [] Alginate    [] Endoform [] Collagen with Silver (Sandra)     [] Santyl with Moisten saline gauze      [] Hydrocolloid   [] Mepilex Foam     [] Mepitel One  [] Adaptic  [x] Xeroform     [] Hydrofera Blue Classic (moisten with saline)   [] Hydrafera Blue Ready  [] Hydrafera Blue Transfer       [] Hydrogel   [] Intrasite     [] Other:          Cover and Secure with:    [x] Gauze [] ABD  [] exudry       [x] Beck [] Kerlix          [] Mepilex Border {mepilex foam with boarder :50582:::1}    [] Ace Wrap [] Other:     [x] Roll Tape       Dressings:           Wound Location Right Lower leg three times week    Apply Primary Dressing:      [] MediHoney Gel [] Medihoney CA Alginate    [] Alginate with Silver (Aquacel AG) [] Alginate    [] Endoform [x] Collagen with Silver (Sandra)     [] Santyl with Moisten saline gauze      [] Hydrocolloid   [] Mepilex Foam     [] Mepitel One  [] Adaptic  [] Xeroform     [] Hydrofera Blue Classic (moisten with saline)   [x] Hydrafera Blue Ready  [] Hydrafera Blue Transfer       [] Hydrogel   [] Intrasite     [] Other:     Cover and Secure with:    [x] Gauze [] ABD  [x] exudry       [x] Beck [] Kerlix          [] Mepilex Border {mepilex foam with boarder :24528:::1}    [] Ace Wrap [] Other:     [x] Roll Tape       Patient may wear own compressions    Off-Loading:   [x] Off-loading when [] walking  [x] in bed [x] sitting    [] Total non-weight bearing  [] Right Leg  [] Left Leg    Dietary:  [x] Diet as tolerated: [] Calorie Diabetic Diet: [] No Added Salt:  [x] Increase Protein: [] Other:     Activity:  [x] Activity as tolerated:  [] Patient has no activity restrictions     [] Strict Bedrest: [] Remain off Work:     [] May return to full duty work:                                   [] Return to work with restrictions:             Return Appointment:  [] nurse visit at wound center in *** days     [] Return Appointment: With Dr. Stephanie Holliday MD list :[id number]}    [x] Ordered tests: Formal JEFERSON/TBI's per Dr. Cici Sarabia     Electronically signed on 12/3/2019 at 9:54 AM     [address] [place] [address] Information: Should you experience any significant changes in your wound(s) or have questions about your wound care, please contact the [address] [place] [address] at [address] [place] [address] 8:00 am - 4:30. If you need help with your wound outside these hours and cannot wait until we are again available, contact your PCP or go to the hospital emergency room. PLEASE NOTE: IF YOU ARE UNABLE TO OBTAIN WOUND SUPPLIES, CONTINUE TO USE THE SUPPLIES YOU HAVE AVAILABLE UNTIL YOU ARE ABLE TO REACH US. IT IS MOST IMPORTANT TO KEEP THE WOUND COVERED AT ALL TIMES.      Physician Signature:_______________________  Dr. Aleksandra Penn

## 2019-12-10 NOTE — WOUND CARE
12/10/19 1112 Wound Leg lower Lateral;Right #1 12/03/19 Date First Assessed/Time First Assessed: 12/03/19 0903   Present on Hospital Admission: Yes  Wound Approximate Age at First Assessment (Weeks): 3 weeks  Primary Wound Type: Trauma  Location: Leg lower  Wound Location Orientation: Lateral;Right  Wound . .. Dressing Status Old drainage Dressing Type Gauze;Gauze wrap (jaqueline) (HFBR) Wound Length (cm) 5.1 cm Wound Width (cm) 3.7 cm Wound Depth (cm) 0.1 cm Wound Surface Area (cm^2) 18.87 cm^2 Wound Volume (cm^3) 1.89 cm^3 Condition of Base [address];Slough;Granulation Condition of Edges Open Drainage Amount Moderate Drainage Color Serosanguinous Wound Odor None Elsie-wound Assessment Maceration Cleansing and Cleansing Agents  Soap and water Wound Leg lower Left; Anterior #2 12/03/19 Date First Assessed/Time First Assessed: 12/03/19 0904   Present on Hospital Admission: Yes  Wound Approximate Age at First Assessment (Weeks): 3 weeks  Primary Wound Type: Trauma  Location: Leg lower  Wound Location Orientation: Left; Anterior  Wound . .. Dressing Status Old drainage Dressing Type Xeroform Wound Length (cm) 0.1 cm Wound Width (cm) 0.1 cm Wound Depth (cm) 0.1 cm Wound Surface Area (cm^2) 0.01 cm^2 Wound Volume (cm^3) 0 cm^3 Condition of Base Pink Drainage Amount Small Drainage Color Serosanguinous Wound Odor None Visit Vitals /71 Pulse 83 Temp 97.3 °F (36.3 °C) Resp 16 Due to network connection error unable to obtain and upload image

## 2019-12-17 ENCOUNTER — HOSPITAL ENCOUNTER (OUTPATIENT)
Dept: WOUND CARE | Age: 84
Discharge: HOME | End: 2019-12-17
Payer: MEDICARE

## 2019-12-17 VITALS
SYSTOLIC BLOOD PRESSURE: 168 MMHG | HEART RATE: 94 BPM | DIASTOLIC BLOOD PRESSURE: 77 MMHG | RESPIRATION RATE: 16 BRPM | TEMPERATURE: 97 F

## 2019-12-17 PROCEDURE — 74011000250 HC RX REV CODE- 250: Performed by: PODIATRIST

## 2019-12-17 PROCEDURE — 11042 DBRDMT SUBQ TIS 1ST 20SQCM/<: CPT

## 2019-12-17 RX ADMIN — Medication: at 09:10

## 2019-12-17 NOTE — WOUND CARE
12/17/19 1004 Wound Leg lower Lateral;Right #1 12/03/19 Date First Assessed/Time First Assessed: 12/03/19 0903   Present on Hospital Admission: Yes  Wound Approximate Age at First Assessment (Weeks): 3 weeks  Primary Wound Type: Trauma  Location: Leg lower  Wound Location Orientation: Lateral;Right  Wound . .. Dressing Type Applied Collagens/cell matrix;Gauze;ABD pad;Gauze wrap (jaqueline) 
(gentamicin ointment, HFBR) Procedure Tolerated Well Discharge Condition: Stable Pain: 0 Ambulatory Status: Esteban Churchill Discharge Destination: Home Transportation: Car Accompanied by: Family/Caregiver Discharge instructions reviewed with Family/Caregiver  and copy or written instructions have been provided. All questions/concerns have been addressed at this time.

## 2019-12-17 NOTE — WOUND CARE
Wound Center Progress Note Subjective:  
Patient is for follow up of LE ulcer(s). Patient is doing well. No concerns are reported. No difficulty or problems with wound care. Wound care is being performed by staff/pt and consists of dressing changes and offloading wound(s). No complaints of wound pain. There have been no changes in patient's medical history in the interim. ROS:  No fever or chills. No rash. No pain at site of wound Objective:  
General: NAD Psych: Cooperative, no anxiety or depression Neuro: Alert, oriented to person/place/situation. Otherwise nonfocal. 
Extremities: Bilateral moderate pitting edema is noted. Skin color is normal.  
Vascular exam:  No gross changes in pedal pulses. Capillary refill is intact, <3sec. Dermatologic:  Skin color appears normal for patient. Skin turgor is normal. Dystrophic nails are seen on the feet bilaterally. Ulcer Description:  
Measurement: in cm pre/post debridement:  5.0 x 3.0 x 0.1 / same inc depth to 0.2 and L leg 0.3 x0.2 x0.1/same Ulcer bed: Granular/Healthy Periwound: Calloused, nontender Exudate: Moderate amount Serous exudate Odor:  - 
  
Assessment: 
R lower leg lateral VSU L leg VSU - epith Venous stais 
  
Plan: 
  
Dressing: Gent, lauri, HFB and her own compression socks Frequency: every other day. JEFERSON's pending Note actual DOS was 12/10/19  Not 12/17. [facility] was down on 12/10 and unable to do note. Plan is reviewed with patient who expresses understanding. Questions were answered. Patient is to follow up with me in 1 wk. Mckinley Shankar DPM 
 
 
 
Ulcer assessment: Due to presence of necrotic tissue within the wound bed, ulcer requires debridement. Procedure: Debridement:  
The indication for debridement was reviewed with patient. Risks of procedure (bleeding, infection, pain) were discussed with patient and consent signed. Questions were answered Subcutaneous excisional debridement Indication: to remove necrotic tissue/ devitalized tissue/ soft eschar/ infected tissue/obtain deep wound culture through subcutaneous layer of wound bed Consent in chart Anesthesia: Topical 2% lidocaine jelly Instrument: 15 Blade,  Residual Necrosis: Present and scored Bleeding: <1ml Hemostasis: Pressure Patient tolerated procedure well Procedural Pain: none Post - procedural pain: none Post debridement measurements: see progress note.  
Surface area debrided: <20 sq. cm

## 2019-12-17 NOTE — WOUND CARE
Wound Center Progress Note Subjective:  
Patient is for follow up of LE ulcer(s). Patient is doing well. No concerns are reported. No difficulty or problems with wound care. Wound care is being performed by staff/pt and consists of dressing changes and offloading wound(s). No complaints of wound pain. There have been no changes in patient's medical history in the interim. ROS:  No fever or chills. No rash. No pain at site of wound Objective:  
General: NAD Psych: Cooperative, no anxiety or depression Neuro: Alert, oriented to person/place/situation. Otherwise nonfocal. 
Extremities: Bilateral mild pitting edema is noted. Skin color is normal.  
Vascular exam:  No gross changes in pedal pulses. Capillary refill is intact, <3sec. Dermatologic:  Skin color appears normal for patient. Skin turgor is normal. Dystrophic nails are seen on the feet bilaterally. Ulcer Description:  
Measurement: in cm pre/post debridement:  3.0 x 2.0 x 0.1 / same inc depth to 0.2 and L leg 0.1 x0.1 x0.1/epith Ulcer bed: Granular/Healthy Periwound: Calloused, nontender Exudate: Moderate amount Serous exudate Odor:  - Assessment: 
R lower leg lateral VSU L leg VSU - epith Venous stais Plan: 
 
Dressing: Ara Paulson, SALENA and her own compression socks Frequency: every other day. JEFERSON's showed moderate reduction in R LE and mild in L LE. Referral to vascular. Instructed Pt not to inc compression and to watch color of feet/toes. Sounds like she will f/u on 12/31 then change to Dr. Manule Judd. Plan is reviewed with patient who expresses understanding. Questions were answered. Patient is to follow up with me in 1 wk. Mckinley Mosqueda DPM 
 
 
 
Ulcer assessment: Due to presence of necrotic tissue within the wound bed, ulcer requires debridement. Procedure: Debridement:  
The indication for debridement was reviewed with patient.  Risks of procedure (bleeding, infection, pain) were discussed with patient and consent signed. Questions were answered Subcutaneous excisional debridement Indication: to remove necrotic tissue/ devitalized tissue/ soft eschar/ infected tissue/obtain deep wound culture through subcutaneous layer of wound bed Consent in chart Anesthesia: Topical 2% lidocaine jelly Instrument: 15 Blade,  Residual Necrosis: Present and scored Bleeding: <1ml Hemostasis: Pressure Patient tolerated procedure well Procedural Pain: none Post - procedural pain: none Post debridement measurements: see progress note.  
Surface area debrided: <20 sq. cm

## 2019-12-17 NOTE — WOUND CARE
12/17/19 2268 Wound Leg lower Left; Anterior #2 12/03/19 Date First Assessed/Time First Assessed: 12/03/19 0904   Present on Hospital Admission: Yes  Wound Approximate Age at First Assessment (Weeks): 3 weeks  Primary Wound Type: Trauma  Location: Leg lower  Wound Location Orientation: Left; Anterior  Wound . .. Wound Length (cm) 0 cm Wound Width (cm) 0 cm Wound Depth (cm) 0 cm Wound Surface Area (cm^2) 0 cm^2 Wound Volume (cm^3) 0 cm^3 Condition of Base Epithelializing Drainage Amount None Wound Odor None Wound Leg lower Lateral;Right #1 12/03/19 Date First Assessed/Time First Assessed: 12/03/19 0903   Present on Hospital Admission: Yes  Wound Approximate Age at First Assessment (Weeks): 3 weeks  Primary Wound Type: Trauma  Location: Leg lower  Wound Location Orientation: Lateral;Right  Wound . .. Dressing Status Old drainage Wound Length (cm) 4.5 cm Wound Width (cm) 3 cm Wound Depth (cm) 0.1 cm Wound Surface Area (cm^2) 13.5 cm^2 Wound Volume (cm^3) 1.35 cm^3 Condition of Base [place];Slough Drainage Amount Moderate Drainage Color Serosanguinous Wound Odor None Cleansing and Cleansing Agents  Normal saline Blood pressure 168/77, pulse 94, temperature 97 °F (36.1 °C), resp. rate 16. Due to network connection error unable to obtain and upload image

## 2019-12-17 NOTE — DISCHARGE INSTRUCTIONS
Discharge Instructions for  [facility]  [address] [place], [address] [facility]  Telephone: [phone]    NAME:  Rose Chavez  YOB: 1927  DATE:  12/10/2019    [] Wound and dressing supply provider: {MADHAVI provider:[id number]}    [x] Home Healthcare: Patient to call with number    Wound Cleansing:   Do not scrub or use excessive force. Cleanse wound prior to applying a clean dressing with:    [x] Normal Saline   [] Keep Wound Dry in Shower      [] Wound Cleanser   [] Cleanse wound with Mild Soap & Water    [] May Shower at Discharge   [] Do not shower  [x] cleanse with baby shampoo and rinse      Topical Treatments:  Do not apply lotions, creams, or ointments to wound bed unless directed.      [] Apply moisturizing lotion {laureano lotions :37885} to skin surrounding the wound prior to dressing change.  [] Bactroban/Mupirocin  [x] Gentamicin ointment to right leg wound   [] Other: gentian violet to wound bed and periwound  [] Other:         Dressings:           Wound Location Left lower leg three times a week     Apply Primary Dressing:      [] MediHoney Gel [] Medihoney CA Alginate    [] Alginate with Silver (Aquacel AG) [] Alginate    [] Endoform [] Collagen with Silver (Sandra)     [] Santyl with Moisten saline gauze      [] Hydrocolloid   [] Mepilex Foam     [] Mepitel One  [] Adaptic  [x] Xeroform     [] Hydrofera Blue Classic (moisten with saline)   [] Hydrafera Blue Ready  [] Hydrafera Blue Transfer       [] Hydrogel   [] Intrasite     [] Other:          Cover and Secure with:    [x] Gauze [] ABD  [] exudry       [x] Beck [] Kerlix          [] Mepilex Border {mepilex foam with boarder :57746:::1}    [] Ace Wrap [] Other:     [x] Roll Tape       Dressings:           Wound Location Right Lower leg three times week    Apply Primary Dressing:      [] MediHoney Gel [] Medihoney CA Alginate    [] Alginate with Silver (Aquacel AG) [] Alginate    [] Endoform [x] Collagen with Silver (Sandra)     [] Santyl with Moisten saline gauze      [] Hydrocolloid   [] Mepilex Foam     [] Mepitel One  [] Adaptic  [] Xeroform     [] Hydrofera Blue Classic (moisten with saline)   [x] Hydrafera Blue Ready  [] Hydrafera Blue Transfer       [] Hydrogel   [] Intrasite     [] Other:     Cover and Secure with:    [x] Gauze [] ABD  [x] exudry       [x] Beck [] Kerlix          [] Mepilex Border {mepilex foam with boarder :63394:::1}    [] Ace Wrap [] Other:     [x] Roll Tape       Patient may wear own compressions    Off-Loading:   [x] Off-loading when [] walking  [x] in bed [x] sitting    [] Total non-weight bearing  [] Right Leg  [] Left Leg    Dietary:  [x] Diet as tolerated: [] Calorie Diabetic Diet: [] No Added Salt:  [x] Increase Protein: [] Other:     Activity:  [x] Activity as tolerated:  [] Patient has no activity restrictions     [] Strict Bedrest: [] Remain off Work:     [] May return to full duty work:                                   [] Return to work with restrictions:             Return Appointment:  [] nurse visit at wound center in *** days     [] Return Appointment: With Dr. Chauncey Mcgovern MD list :[id number]}    [x] Ordered tests: Patient to follow up with Dr. Augusta Aj at [address] [place] [address] if available     Electronically signed on 12/10/2019      [address] [place] [address] Information: Should you experience any significant changes in your wound(s) or have questions about your wound care, please contact the [address] [place] [address] at [address] [place] [address] 8:00 am - 4:30. If you need help with your wound outside these hours and cannot wait until we are again available, contact your PCP or go to the hospital emergency room. PLEASE NOTE: IF YOU ARE UNABLE TO OBTAIN WOUND SUPPLIES, CONTINUE TO USE THE SUPPLIES YOU HAVE AVAILABLE UNTIL YOU ARE ABLE TO REACH US. IT IS MOST IMPORTANT TO KEEP THE WOUND COVERED AT ALL TIMES.      Physician Signature:_______________________  Dr. Brandon Shi Lyubov Saha

## 2019-12-31 ENCOUNTER — HOSPITAL ENCOUNTER (OUTPATIENT)
Dept: WOUND CARE | Age: 84
Discharge: HOME | End: 2019-12-31
Payer: MEDICARE

## 2019-12-31 VITALS
RESPIRATION RATE: 17 BRPM | DIASTOLIC BLOOD PRESSURE: 74 MMHG | SYSTOLIC BLOOD PRESSURE: 185 MMHG | HEART RATE: 92 BPM | TEMPERATURE: 97.6 F

## 2019-12-31 PROCEDURE — 99212 OFFICE O/P EST SF 10 MIN: CPT

## 2019-12-31 NOTE — WOUND CARE
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 - RT LE ulcer healed. Will have [facility] come out for one more week to stabilize epithelialized skin, and then will have [facility] D/C. D/C from [facility]. Encouraged compliance with compression hose and lotioning. Subjective:  Pt complains of wound to RT LE. Previous tx include local care. Neg for fever, chills, nausea, vomiting, chest pain, shortness of breath. HPI: 80 y.o. F PMH CKD, HTN, HLD, GERD, MICHELLE, CHF present for RLE ulcer. Has been getting local care and improving. ROS:  Consitutional: no weight loss, night sweats, fatigue / malaise / lethargy. Musculoskeletal: no joint / extremity pain, misalignment, stiffness, decreased ROM, crepitus. Integument: No pruritis, rashes, lesions, RT LE wound.   Psychiatric: No depression, anxiety, paranoia      History:  Right lower leg wound  Allergies   Allergen Reactions    Penicillins Rash    Polymyxin B Sulf-Trimethoprim Other (comments)     RED, ITCHY EYES    Sulfa (Sulfonamide Antibiotics) Rash     Family History   Problem Relation Age of Onset    Hypertension Mother     Heart Disease Father       Past Medical History:   Diagnosis Date    Arrhythmia     H/O A-FIB    Arthritis     Cancer ([facility] [address]     h/o skin cancer on neck, forehead, L and R leg    CKD (chronic kidney disease), stage II     Corkscrew esophagus     GERD (gastroesophageal reflux disease)     Heart failure (HCC)     Hyperlipidemia     Hypertension     Hypothyroidism     Ill-defined condition 2015    RIGHT EAR TUBE MILD HEARING LOSS    Sleep apnea     WEARS GUARD     Past Surgical History: Procedure Laterality Date    HX GI      1983 gallbladder removed    HX GYN      tubal ligation 1961    HX GYN      2009 ovary cyst removed    HX GYN      D&C    HX HEENT      2012 L eye cataract removed    HX HYSTERECTOMY  2009    HX ORTHOPAEDIC      R foot surgery    HX OTHER SURGICAL      1984 neck cyst removed    HX OTHER SURGICAL      1992 cystocele and rectocele    HX OTHER SURGICAL      1991 plastic surgery for L and R leg skin cancer    HX PACEMAKER      HX UROLOGICAL      bladder lift 1961     Social History     Tobacco Use    Smoking status: Never Smoker    Smokeless tobacco: Never Used   Substance Use Topics    Alcohol use: No       Social History     Substance and Sexual Activity   Alcohol Use No     Social History     Substance and Sexual Activity   Drug Use No      Social History     Tobacco Use   Smoking Status Never Smoker   Smokeless Tobacco Never Used     Current Outpatient Medications   Medication Sig    amitriptyline HCl (AMITRIPTYLINE PO) Take  by mouth once.  methscopolamine (PAMINE) 2.5 mg tab tablet Take  by mouth.  sennosides (SENNA) 8.6 mg cap Take  by mouth.  dilTIAZem ER (CARDIZEM LA) 120 mg tablet Take 180 mg by mouth daily.  spironolactone (ALDACTONE) 25 mg tablet Take 12.5 mg by mouth daily.  OTHER Take  by mouth daily.  dextran 70-hypromellose (TEARS PURE) ophthalmic solution Administer 2 Drops to both eyes as needed.  HYDROcodone-acetaminophen (NORCO) 5-325 mg per tablet Take 1-2 Tabs by mouth every four (4) hours as needed for Pain. Max Daily Amount: 12 Tabs.  Cholecalciferol, Vitamin D3, 3,000 unit tab Take  by mouth.  folic acid (FOLVITE) 1 mg tablet Take  by mouth daily.  vit A/vit C/vit E/zinc/copper (PRESERVISION AREDS PO) Take  by mouth.  coenzyme q10 (CO Q-10) 10 mg cap Take  by mouth.  B.infantis-B.ani-B.long-B.bifi (PROBIOTIC 4X) 10-15 mg TbEC Take  by mouth.     cyanocobalamin (VITAMIN B-12) 1,000 mcg/mL injection 1,000 mcg by IntraMUSCular route. EVERY TWO WEEKS    aspirin 81 mg chewable tablet Take 1 Tab by mouth daily.  simvastatin (ZOCOR) 5 mg tablet Take 5 mg by mouth nightly.  simethicone (MYLICON) 80 mg chewable tablet Take 80 mg by mouth every six (6) hours as needed for Flatulence.  levothyroxine (SYNTHROID) 75 mcg tablet Take 75 mcg by mouth Daily (before breakfast).  magnesium 250 mg Tab Take 1 Tab by mouth two (2) times a day.  nitroglycerin (NITROSTAT) 0.3 mg SL tablet by SubLINGual route every five (5) minutes as needed. Current Facility-Administered Medications   Medication Dose Route Frequency    lidocaine (ALOCANE) 4 % topical gel   Topical NOW        Objective:  Visit Vitals  /74 (BP 1 Location: Right arm, BP Patient Position: Sitting)   Pulse 92   Temp 97.6 °F (36.4 °C)   Resp 17       Vascular:  B/L LE  DP 0/4; PT 0/4  capillary fill time brisk, pitting edema is present, skin temperature is cool, varicosities are present. Dermatological:    Wound: 1  Location: RT LE  Measurements: per RN note  Margins: hemosiderosis  Drainage: none  Odor: none  Wound base: newly epithelialized skin  Lymphangitic streaking? No.  Undermining? No.  Sinus tracts? No.  Exposed bone? No.  Subcutaneous crepitation on palpation? No.    Wound Length:      Wound Width :     Wound Depth :     Nails are thickened, elongated, discolored, painful to palpation, 3 mm thick, with subungual debris. There are extensive skin cracks at both heels. Skin is dry and scaly, exhibits hemosiderin deposition. There is no maceration of the interspaces of the feet b/l. Lesions are present consisting of hyperkeratosis at tip of LT 3rd toe. Neurological:  DTR are present, protective sensation per 5.07 [place] Georges monofilament is present, patient is AAOx3, mood is normal. Epicritic sensation is intact.     Orthopedic:  B/L LE are symmetric, ROM of ankle, STJ, 1st MTPJ is limited, MMT 5 out of 5 for B/L KRISTAN.    Constitutional: Pt is a well developed, thin elderly  female. Lymphatics: negative tenderness to palpation of neck/axillary/inguinal nodes.     Imaging / Labs / Cx / Px:  JEFERSON revealed mildly decreased perfusion of b/l LE

## 2019-12-31 NOTE — WOUND CARE
12/31/19 0949   Wound Leg lower Lateral;Right #1 12/03/19   Date First Assessed/Time First Assessed: 12/03/19 0903   Present on Hospital Admission: Yes  Wound Approximate Age at First Assessment (Weeks): 3 weeks  Primary Wound Type: Trauma  Location: Leg lower  Wound Location Orientation: Lateral;Right  Wound . ..    Dressing Status Removed   Dressing Type Other (Comment)  (Sandra, HBR, gauze )   Wound Length (cm) 0.1 cm   Wound Width (cm) 0.1 cm   Wound Depth (cm) 0.1 cm   Wound Surface Area (cm^2) 0.01 cm^2   Wound Volume (cm^3) 0 cm^3   Condition of Base Other (comment)  (Scabbed )   Condition of Edges Closed   Drainage Amount None   Wound Odor None   Cleansing and Cleansing Agents  Normal saline     Visit Vitals  /74 (BP 1 Location: Right arm, BP Patient Position: Sitting)   Pulse 92   Temp 97.6 °F (36.4 °C)   Resp 17     Due to network connection error unable to obtain and upload image PRE-ADMIT TESTING -  [phone]    [address]  [place] [facility]          Your surgery has been scheduled at [facility]. We are pleased to have the opportunity to serve you. For Further Information please call [phone].    On the day of surgery please report to the Information Desk on the 1st floor.    · CONTACT YOUR PHYSICIAN'S OFFICE THE DAY PRIOR TO YOUR SURGERY TO OBTAIN YOUR ARRIVAL TIME.     · The evening before surgery do not eat anything after 9 p.m. ( this includes hard candy, chewing gum and mints).  You may only have GATORADE, POWERADE AND WATER  from 9 p.m. until you leave your home.   DO NOT DRINK ANY LIQUIDS ON THE WAY TO THE HOSPITAL.      SPECIAL MEDICATION INSTRUCTIONS: TAKE medications checked off by the Anesthesiologist on your Medication List.    Angiogram Patients: Take medications as instructed by your physician, including aspirin.     Surgery Patients:    If you take ASPIRIN - Your PHYSICIAN/SURGEON will need to inform you IF/OR when you need to stop taking aspirin prior to your surgery.     Do Not take any medications containing IBUPROFEN.  Do Not Wear any make-up or dark nail polish   (especially eye make-up) to surgery. If you come to surgery with makeup on you will be required to remove the makeup or nail polish.    Do not shave your surgical area at least 5 days prior to your surgery. The surgical prep will be performed at the hospital according to Infection Control regulations.    Leave all valuables at home.   Do Not wear any jewelry or watches, including any metal in body piercings. Jewelry must be removed prior to coming to the hospital.  There is a possibility that rings that are unable to be removed may be cut off if they are on the surgical extremity.    Contact Lens must be removed before surgery. Either do not wear the contact lens or bring a case and solution for storage.  Please bring a container for eyeglasses or dentures as required.  Bring  any paperwork your physician has provided, such as consent forms,  history and physicals, doctor's orders, etc.   Bring comfortable clothes that are loose fitting to wear upon discharge. Take into consideration the type of surgery being performed.  Maintain your diet as advised per your physician the day prior to surgery.      Adequate rest the night before surgery is advised.   Park in the Parking lot behind the hospital or in the [facility] across the street from the parking lot. Parking is complimentary.  If you will be discharged the same day as your procedure, please arrange for a responsible adult to drive you home or to accompany you if traveling by taxi.   YOU WILL NOT BE PERMITTED TO DRIVE OR TO LEAVE THE HOSPITAL ALONE AFTER SURGERY.   It is strongly recommended that you arrange for someone to remain with you for the first 24 hrs following your surgery.    The Surgeon will speak to your family/visitor after your surgery regarding the outcome of your surgery and post op care.  The Surgeon may speak to you after your surgery, but there is a possibility you may not remember the details.  Please check with your family members regarding the conversation with the Surgeon.      Thank you for your cooperation.  The Staff of [facility].                Bathing Instructions with Hibiclens     Shower the evening before and morning of your procedure with Hibiclens:   Wash your face with water and your regular face wash/soap   Apply Hibiclens directly on your skin or on a wet washcloth and wash gently. When showering: Move away from the shower stream when applying Hibiclens to avoid rinsing off too soon.   Rinse thoroughly with warm water   Do not dilute Hibiclens         Dry off as usual, do not use any deodorant, powder, body lotions, perfume, after shave or cologne.

## 2019-12-31 NOTE — WOUND CARE
12/31/19 1016   Wound Leg lower Lateral;Right #1 12/03/19   Date First Assessed/Time First Assessed: 12/03/19 0903   Present on Hospital Admission: Yes  Wound Approximate Age at First Assessment (Weeks): 3 weeks  Primary Wound Type: Trauma  Location: Leg lower  Wound Location Orientation: Lateral;Right  Wound . .. Dressing Type Applied Foam  (gentian violet, gentamicin)   Discharge Condition: Stable     Pain: 0    Ambulatory Status: Walking    Discharge Destination: Home     Transportation: Taxi    Accompanied by: Self  and Family/Caregiver     Discharge instructions reviewed with Patient and Family/Caregiver  and copy or written instructions have been provided. All questions/concerns have been addressed at this time.

## 2019-12-31 NOTE — PROGRESS NOTES
Clinic Level of Care Assessment    NAME:  Elenita Huber OF BIRTH:  3/30/1927 GENDER: female  MEDICAL RECORD NUMBER:  [id number]   DATE:  12/31/2019      Wound Count Document in [facility]  Number of Wounds Assessed Points   No Wounds/Ulcers []   0   Less than Three Wounds/Ulcers [x]   1   3-6 Wounds/Ulcers []   2   Greater than 6 Wounds/Ulcers []   3     Ambulation Status Document in Coord/ANAID/Mobility tab  Status Definition Points   Independent Independently able to ambulate. Fully able (without any assistance) to get on/off exam table/chair. []   0   Minimal Physical Assistance Requires physical assistance of one person to ambulate and/or position patient to be examined. Includes necessary physical assistance to position lower extremities on/off stool. [x]   1   Moderate Physical Assistance Requires at least one staff member to physically assist patient in ambulating into treatment room, and on/off exam table. []   2   Full Assistance Requires assistance of at least two staff members to transfer patient into treatment room and/or on/off exam table/chair. \"Total Transfer\". []   3     Dressing Complexity Document in LDA and Write Appropriate Order  Complexity Definition Points   No Dressing  []   0   Simple Minimal, simple dressing. i.e. Band-aid, gauze, simple wrap. []   1   Intermediate Moderately complicated requiring licensed personnel to apply i.e. collagen matrix, ointments, gels, alginates. [x]   2   Complex Complicated requiring licensed personnel to apply dressings 6 or more wounds. []   3     Teaching Effort Document in Education Tab   Effort Definition Points   No Teaching  [x]   0   Simple Reinforce two or less topics. Document in Education navigator.  []   1   Intermediate Reinforce three to five topics and/or one additional   new topic. Document in Education navigator. []   2   Complex Teach more than one new topic.  New patient information   packet reviewed and/or reinforce more than three topics. Document in Education navigator. HBO initial instruction. []   3       Patient Assessment and Planning  Planning Definition Points   Simple Multiple System Simple: Simple follow-up with routine assessment and planning. If Discharged, instructions and long term/follow-up care given to patient/caregiver. Discharged, instructions and/or After Visit Summary given to patient/caregiver and instructions completed. [x]   1   Intermediate Multiple System Intermediate: Contact with outside resources; i.e. Telephone calls to home health, [facility]. May include filling out forms and writing letters, arranging transportation, communication with insurance , vendors, etc.  Discharged, instructions and/or After Visit Summary given to patient/caregiver and instructions completed. []   2   Complex Multiple System Complex: Full, comprehensive assessment and planning. Follow the entire navigator under Wound Visit charting filling out each tab which includes OP Adm Database Screening, Education and CarePlan  HBO risk assessment completed. Discharged, instructions and/or After Visit Summary given to patient/caregiver and instructions completed.    []   3           Is this the Patient's First Visit to the [address] [place] [address] Road  No      Is this Patient Established @ [facility]  Yes             Clinical Level of Care      Points  0-2  Level 1 []     Points  3-5  Level 2 [x]     Points  6-9  Level 3 []     Points  10-12  Level 4 []     Points  13-15  Level 5 []       Electronically signed by Chacorta Knott RN on 12/31/2019 at 10:12 AM

## 2019-12-31 NOTE — DISCHARGE INSTRUCTIONS
Discharge Instructions for  [facility]  [place] [address], [address] [facility] [address]  Telephone: [phone]    NAME:  Dean Suarez  YOB: 1927  DATE:  12/17/2019    [] Wound and dressing supply provider: {MADHAVI provider:08657}    [x] Home Healthcare:  [facility]    Wound Cleansing:   Do not scrub or use excessive force. Cleanse wound prior to applying a clean dressing with:    [x] Normal Saline   [] Keep Wound Dry in Shower      [] Wound Cleanser   [] Cleanse wound with Mild Soap & Water    [] May Shower at Discharge   [] Do not shower  [x] cleanse with baby shampoo and rinse      Topical Treatments:  Do not apply lotions, creams, or ointments to wound bed unless directed.      [] Apply moisturizing lotion {laureano lotions :51903} to skin surrounding the wound prior to dressing change.  [] Bactroban/Mupirocin  [x] Gentamicin ointment to right leg wound   [] Other: gentian violet to wound bed and periwound  [] Other:       Dressings:           Wound Location Right Lower leg three times week    Apply Primary Dressing:      [] MediHoney Gel [] Medihoney CA Alginate    [] Alginate with Silver (Aquacel AG) [] Alginate    [] Endoform [x] Collagen with Silver (Sandra)     [] Santyl with Moisten saline gauze      [] Hydrocolloid   [] Mepilex Foam     [] Mepitel One  [] Adaptic  [] Xeroform     [] Hydrofera Blue Classic (moisten with saline)   [x] Hydrafera Blue Ready  [] Hydrafera Blue Transfer       [] Hydrogel   [] Intrasite     [] Other:     Cover and Secure with:    [x] Gauze [] ABD  [x] exudry       [x] Beck [] Kerlix          [] Mepilex Border {mepilex foam with boarder :35285:::1}    [] Ace Wrap [] Other:     [x] Roll Tape       Patient may wear own compressions    Off-Loading:   [x] Off-loading when [] walking  [x] in bed [x] sitting    [] Total non-weight bearing  [] Right Leg  [] Left Leg    Dietary:  [x] Diet as tolerated: [] Calorie Diabetic Diet: [] No Added Salt:  [x] Increase Protein: [] Other:     Activity:  [x] Activity as tolerated:  [] Patient has no activity restrictions     [] Strict Bedrest: [] Remain off Work:     [] May return to full duty work:                                   [] Return to work with restrictions:             Return Appointment:  [] nurse visit at wound center in *** days     [x] Return Appointment: With Dr. Dr. Dagoberto Daniels 2 weeks    [x] Ordered tests: Follow up with vascular- see if there is any recommendations according to vascular studies per Dr. Rosa Jose   Electronically signed on 12/17/2019      [address] [place] [address] Information: Should you experience any significant changes in your wound(s) or have questions about your wound care, please contact the [facility] Main at [address] [place] [address] 8:00 am - 4:30. If you need help with your wound outside these hours and cannot wait until we are again available, contact your PCP or go to the hospital emergency room. PLEASE NOTE: IF YOU ARE UNABLE TO OBTAIN WOUND SUPPLIES, CONTINUE TO USE THE SUPPLIES YOU HAVE AVAILABLE UNTIL YOU ARE ABLE TO REACH US. IT IS MOST IMPORTANT TO KEEP THE WOUND COVERED AT ALL TIMES.      Physician Signature:_______________________  Dr. Dagoberto Daniels

## 2019-12-31 NOTE — DISCHARGE INSTRUCTIONS
Discharge Instructions for  [facility]  [place] [address] [place], [address] [facility] [address]  Telephone: [phone]    NAME:  Iliana Santamaria  YOB: 1927  DATE:  12/31/2019    [] Wound and dressing supply provider: {MADHAVI provider:87674}    [x] Home Healthcare:  [facility] for 1 more week, if it gets worse may come back to wound care center    Wound Cleansing:   Do not scrub or use excessive force. Cleanse wound prior to applying a clean dressing with:    [x] Normal Saline   [] Keep Wound Dry in Shower      [] Wound Cleanser   [] Cleanse wound with Mild Soap & Water    [] May Shower at Discharge   [] Do not shower  [x] cleanse with baby shampoo and rinse      Topical Treatments:  Do not apply lotions, creams, or ointments to wound bed unless directed.      [x] Apply moisturizing lotion A&D ointment to skin surrounding the wound prior to dressing change.  [] Bactroban/Mupirocin  [x] Gentamicin ointment to right leg wound   [] Other: gentian violet to wound bed and periwound  [] Other:       Dressings:           Wound Location Right Lower leg three times week    Apply Primary Dressing:      [] MediHoney Gel [] Medihoney CA Alginate    [] Alginate with Silver (Aquacel AG) [] Alginate    [] Endoform [x] Collagen with Silver (Sandra)     [] Santyl with Moisten saline gauze      [] Hydrocolloid   [] Mepilex Foam     [] Mepitel One  [] Adaptic  [] Xeroform     [] Hydrofera Blue Classic (moisten with saline)   [x] Hydrafera Blue Ready  [] Hydrafera Blue Transfer       [] Hydrogel   [] Intrasite     [] Other:     Cover and Secure with:    [x] Gauze [] ABD  [x] exudry       [x] Beck [] Kerlix          [] Mepilex Border {mepilex foam with boarder :58495:::1}    [] Ace Wrap [] Other:     [x] Roll Tape       Patient may wear own compressions    Off-Loading:   [x] Off-loading when [] walking  [x] in bed [x] sitting    [] Total non-weight bearing  [] Right Leg [] Left Leg    Dietary:  [x] Diet as tolerated: [] Calorie Diabetic Diet: [] No Added Salt:  [x] Increase Protein: [] Other:     Activity:  [x] Activity as tolerated:  [] Patient has no activity restrictions     [] Strict Bedrest: [] Remain off Work:     [] May return to full duty work:                                   [] Return to work with restrictions:             Return Appointment:  [] nurse visit at wound center in *** days     [] Return Appointment:     [] Ordered tests:     Electronically signed on 12/31/2019      Lisette Whitney 281: Should you experience any significant changes in your wound(s) or have questions about your wound care, please contact the [facility] Main at [address] [place] [address] 8:00 am - 4:30. If you need help with your wound outside these hours and cannot wait until we are again available, contact your PCP or go to the hospital emergency room. PLEASE NOTE: IF YOU ARE UNABLE TO OBTAIN WOUND SUPPLIES, CONTINUE TO USE THE SUPPLIES YOU HAVE AVAILABLE UNTIL YOU ARE ABLE TO REACH US. IT IS MOST IMPORTANT TO KEEP THE WOUND COVERED AT ALL TIMES.      Physician Signature:_______________________  Dr. Iliana Martines

## 2020-07-16 ENCOUNTER — HOSPITAL ENCOUNTER (OUTPATIENT)
Dept: INFUSION THERAPY | Age: 85
Discharge: HOME | End: 2020-07-16
Payer: MEDICARE

## 2020-07-16 VITALS
HEART RATE: 81 BPM | DIASTOLIC BLOOD PRESSURE: 61 MMHG | RESPIRATION RATE: 16 BRPM | TEMPERATURE: 98 F | OXYGEN SATURATION: 97 % | SYSTOLIC BLOOD PRESSURE: 132 MMHG

## 2020-07-16 LAB — PHOSPHATE SERPL-MCNC: 4 MG/DL (ref 2.6–4.7)

## 2020-07-16 PROCEDURE — 96372 THER/PROPH/DIAG INJ SC/IM: CPT

## 2020-07-16 PROCEDURE — 36415 COLL VENOUS BLD VENIPUNCTURE: CPT

## 2020-07-16 PROCEDURE — 74011250636 HC RX REV CODE- 250/636: Performed by: FAMILY MEDICINE

## 2020-07-16 PROCEDURE — 84100 ASSAY OF PHOSPHORUS: CPT

## 2020-07-16 RX ADMIN — DENOSUMAB 60 MG: 60 INJECTION SUBCUTANEOUS at 14:52

## 2020-07-16 NOTE — PROGRESS NOTES
Outpatient Infusion Center   Visit Progress Note    1430 Patient admitted to [facility] for Prolia in stable condition. Assessment completed. No new concerns voiced. Labs drawn 6/17 by [facility]. Results reviewed. Pt denies having recent dental procedures in the past 4-6 weeks and states no plans for any in the near future. Pt verbalized understanding importance of notifying dentist of taking Prolia. VS  Patient Vitals for the past 12 hrs:   Temp Pulse Resp BP SpO2   07/16/20 1400 98 °F (36.7 °C) 81 16 132/61 97 %       Medications:  Medications Administered     denosumab (PROLIA) injection 60 mg     Admin Date  07/16/2020 Action  Given Dose  60 mg Route  SubCUTAneous Administered By  Pradeep Cooper RN                  1500 Patient tolerated treatment well. Patient discharged from [place] [address] in no distress. Patient aware of next appointment.     Labs  Recent Results (from the past 12 hour(s))   PHOSPHORUS    Collection Time: 07/16/20  2:06 PM   Result Value Ref Range    Phosphorus 4.0 2.6 - 4.7 MG/DL

## 2021-01-21 ENCOUNTER — HOSPITAL ENCOUNTER (OUTPATIENT)
Dept: INFUSION THERAPY | Age: 86
Discharge: HOME | End: 2021-01-21
Payer: MEDICARE

## 2021-01-21 VITALS
TEMPERATURE: 98.1 F | HEART RATE: 71 BPM | DIASTOLIC BLOOD PRESSURE: 66 MMHG | OXYGEN SATURATION: 98 % | SYSTOLIC BLOOD PRESSURE: 165 MMHG | RESPIRATION RATE: 16 BRPM | WEIGHT: 123.7 LBS | BODY MASS INDEX: 25.85 KG/M2

## 2021-01-21 LAB
ANION GAP BLD CALC-SCNC: 11 MMOL/L (ref 10–20)
BUN BLD-MCNC: 29 MG/DL (ref 9–20)
CA-I BLD-MCNC: 1.26 MMOL/L (ref 1.12–1.32)
CHLORIDE BLD-SCNC: 104 MMOL/L (ref 98–107)
CO2 BLD-SCNC: 30 MMOL/L (ref 21–32)
CREAT BLD-MCNC: 1.2 MG/DL (ref 0.6–1.3)
GLUCOSE BLD-MCNC: 78 MG/DL (ref 65–100)
HCT VFR BLD CALC: 36 % (ref 35–47)
MAGNESIUM SERPL-MCNC: 2.5 MG/DL (ref 1.6–2.4)
PHOSPHATE SERPL-MCNC: 4.2 MG/DL (ref 2.6–4.7)
POTASSIUM BLD-SCNC: 4.7 MMOL/L (ref 3.5–5.1)
SERVICE CMNT-IMP: ABNORMAL
SODIUM BLD-SCNC: 139 MMOL/L (ref 136–145)

## 2021-01-21 PROCEDURE — 36415 COLL VENOUS BLD VENIPUNCTURE: CPT

## 2021-01-21 PROCEDURE — 74011250636 HC RX REV CODE- 250/636: Performed by: FAMILY MEDICINE

## 2021-01-21 PROCEDURE — 96372 THER/PROPH/DIAG INJ SC/IM: CPT

## 2021-01-21 PROCEDURE — 84100 ASSAY OF PHOSPHORUS: CPT

## 2021-01-21 PROCEDURE — 83735 ASSAY OF MAGNESIUM: CPT

## 2021-01-21 PROCEDURE — 80047 BASIC METABLC PNL IONIZED CA: CPT

## 2021-01-21 RX ADMIN — DENOSUMAB 60 MG: 60 INJECTION SUBCUTANEOUS at 13:57

## 2021-01-21 NOTE — PROGRESS NOTES
[facility] Progress Note    Date: 2021    Name: Clayton Kline    MRN: [id number]         : 3/30/1927      Ms. Marin Arrived ambulatory and in no distress for Prolia Injection. Assessment was completed, no acute issues at this time, no new complaints voiced. Patient denies having upcoming or future dental work. Patient Vitals for the past 12 hrs:   Temp Pulse Resp BP SpO2   21 1342  71  (!) 165/66    21 1320 98.1 °F (36.7 °C) 82 16 (!) 181/76 98 %         Medications:    Medications Administered     denosumab (PROLIA) injection 60 mg     Admin Date  2021 Action  Given Dose  60 mg Route  SubCUTAneous Administered By  Nicole Lopez RN              To R arm     Ms. Marin tolerated treatment well and was discharged from [place] [address] in stable condition. She is to return on 21 for her next appointment.     Bernadette Rebolledo RN  2021

## 2021-07-08 NOTE — PROGRESS NOTES
New/updated order required for patient's upcoming [facility] appointment. Faxed doctor's office on 07/08/21 at 10:47 AM.  Refer to fax documents in pharmacy for further information.

## 2021-07-12 NOTE — CALL BACK NOTE
New/updated order required for patient's upcoming [facility] appointment. Faxed doctor's office on 07/12/21 at 7:57 AM.  Refer to fax documents in pharmacy for further information.

## 2021-07-14 NOTE — PROGRESS NOTES
Called Dr. Garth Moeller office and spoke with Roney Figueroa, requesting a new Prolia order for tomorrow's treatment. Slime states that she will place the order template on MD desk this morning and fax it over. Provided pharmacy fax number.     Rissa Hollis RN

## 2021-07-15 ENCOUNTER — HOSPITAL ENCOUNTER (OUTPATIENT)
Dept: INFUSION THERAPY | Age: 86
Discharge: HOME | End: 2021-07-15

## 2021-07-22 ENCOUNTER — HOSPITAL ENCOUNTER (OUTPATIENT)
Dept: INFUSION THERAPY | Age: 86
Discharge: HOME | End: 2021-07-22
Payer: MEDICARE

## 2021-07-22 VITALS
BODY MASS INDEX: 24.81 KG/M2 | HEART RATE: 80 BPM | SYSTOLIC BLOOD PRESSURE: 140 MMHG | TEMPERATURE: 97.2 F | DIASTOLIC BLOOD PRESSURE: 65 MMHG | OXYGEN SATURATION: 98 % | RESPIRATION RATE: 16 BRPM | WEIGHT: 118.7 LBS

## 2021-07-22 LAB
ALBUMIN SERPL-MCNC: 3.5 G/DL (ref 3.5–5)
ANION GAP BLD CALC-SCNC: 9 MMOL/L (ref 10–20)
ANION GAP SERPL CALC-SCNC: 4 MMOL/L (ref 5–15)
BUN SERPL-MCNC: 32 MG/DL (ref 6–20)
BUN/CREAT SERPL: 25 (ref 12–20)
CA-I BLD-MCNC: 1.1 MMOL/L (ref 1.12–1.32)
CALCIUM SERPL-MCNC: 9.4 MG/DL (ref 8.5–10.1)
CHLORIDE BLD-SCNC: 111 MMOL/L (ref 98–107)
CHLORIDE SERPL-SCNC: 110 MMOL/L (ref 97–108)
CO2 BLD-SCNC: 24 MMOL/L (ref 21–32)
CO2 SERPL-SCNC: 27 MMOL/L (ref 21–32)
CREAT BLD-MCNC: 1.44 MG/DL (ref 0.6–1.3)
CREAT SERPL-MCNC: 1.28 MG/DL (ref 0.55–1.02)
GLUCOSE BLD-MCNC: 185 MG/DL (ref 65–100)
GLUCOSE SERPL-MCNC: 173 MG/DL (ref 65–100)
MAGNESIUM SERPL-MCNC: 2.3 MG/DL (ref 1.6–2.4)
PHOSPHATE SERPL-MCNC: 3.4 MG/DL (ref 2.6–4.7)
POTASSIUM BLD-SCNC: 4.1 MMOL/L (ref 3.5–5.1)
POTASSIUM SERPL-SCNC: 4.1 MMOL/L (ref 3.5–5.1)
SERVICE CMNT-IMP: ABNORMAL
SODIUM BLD-SCNC: 143 MMOL/L (ref 136–145)
SODIUM SERPL-SCNC: 141 MMOL/L (ref 136–145)

## 2021-07-22 PROCEDURE — 80069 RENAL FUNCTION PANEL: CPT

## 2021-07-22 PROCEDURE — 96372 THER/PROPH/DIAG INJ SC/IM: CPT

## 2021-07-22 PROCEDURE — 80047 BASIC METABLC PNL IONIZED CA: CPT

## 2021-07-22 PROCEDURE — 74011250636 HC RX REV CODE- 250/636: Performed by: FAMILY MEDICINE

## 2021-07-22 PROCEDURE — 83735 ASSAY OF MAGNESIUM: CPT

## 2021-07-22 PROCEDURE — 36415 COLL VENOUS BLD VENIPUNCTURE: CPT

## 2021-07-22 RX ADMIN — DENOSUMAB 60 MG: 60 INJECTION SUBCUTANEOUS at 15:22

## 2021-07-22 NOTE — PROGRESS NOTES
[facility] Progress Note    Date: 2021    Name: Florecita Samuel    MRN: [id number]         : 3/30/1927    Ms. Marin Arrived ambulatory and in no distress for Prolia. Assessment was completed, no acute issues at this time, no new complaints voiced. Patient denies recent/upcoming dental work. iStat BMP not within treatment parameters- Renal panel sent to lab for processing. Calcium resulted 9.4. Patient Vitals for the past 12 hrs:   Temp Pulse Resp BP SpO2   21 1343 97.2 °F (36.2 °C) 80 16 (!) 140/65 98 %       Lab results were obtained and reviewed. Recent Results (from the past 12 hour(s))   POC CHEM8    Collection Time: 21  2:00 PM   Result Value Ref Range    Calcium, ionized (POC) 1.10 (L) 1.12 - 1.32 mmol/L    Sodium (POC) 143 136 - 145 mmol/L    Potassium (POC) 4.1 3.5 - 5.1 mmol/L    Chloride (POC) 111 (H) 98 - 107 mmol/L    CO2 (POC) 24.0 21 - 32 mmol/L    Anion gap (POC) 9 (L) 10 - 20 mmol/L    Glucose (POC) 185 (H) 65 - 100 mg/dL    Creatinine (POC) 1.44 (H) 0.6 - 1.3 mg/dL    GFRAA, POC 41 (L) >60 ml/min/1.73m2    GFRNA, POC 34 (L) >60 ml/min/1.73m2    Comment Comment Not Indicated.      MAGNESIUM    Collection Time: 21  2:01 PM   Result Value Ref Range    Magnesium 2.3 1.6 - 2.4 mg/dL   RENAL FUNCTION PANEL    Collection Time: 21  2:01 PM   Result Value Ref Range    Sodium 141 136 - 145 mmol/L    Potassium 4.1 3.5 - 5.1 mmol/L    Chloride 110 (H) 97 - 108 mmol/L    CO2 27 21 - 32 mmol/L    Anion gap 4 (L) 5 - 15 mmol/L    Glucose 173 (H) 65 - 100 mg/dL    BUN 32 (H) 6 - 20 MG/DL    Creatinine 1.28 (H) 0.55 - 1.02 MG/DL    BUN/Creatinine ratio 25 (H) 12 - 20      GFR est AA 47 (L) >60 ml/min/1.73m2    GFR est non-AA 39 (L) >60 ml/min/1.73m2    Calcium 9.4 8.5 - 10.1 MG/DL    Phosphorus 3.4 2.6 - 4.7 MG/DL    Albumin 3.5 3.5 - 5.0 g/dL       Medications:    Medications Administered     denosumab (PROLIA) injection 60 mg     Admin Date  2021 Action  Given Dose  60 mg Route  SubCUTAneous Administered By  Paty Melgoza RN                    Ms. Ofelia Gallegos tolerated treatment well and was discharged from [place] [address] in stable condition. She is to return on 1/20/22 for her next appointment.     Dedrick Taylor RN  July 22, 2021

## 2022-01-13 NOTE — CALL BACK NOTE
New/updated order required for patient's upcoming [facility] appointment. Faxed doctor's office on 01/13/22 at 10:48 AM.  Refer to fax documents in pharmacy for further information.

## 2022-01-18 NOTE — CALL BACK NOTE
New/updated order required for patient's upcoming [facility] appointment. Faxed doctor's office on 01/18/22 at 7:29 AM.  Refer to fax documents in pharmacy for further information.

## 2022-01-20 ENCOUNTER — HOSPITAL ENCOUNTER (OUTPATIENT)
Dept: INFUSION THERAPY | Age: 87
Discharge: HOME | End: 2022-01-20

## 2022-02-01 NOTE — PROGRESS NOTES
New/updated order required for patient's upcoming [facility] appointment. Faxed doctor's office on 02/01/22 at 10:39 AM.  Refer to fax documents in pharmacy for further information.

## 2022-02-04 NOTE — CALL BACK NOTE
New/updated order required for patient's upcoming [facility] appointment. Faxed doctor's office on 02/04/22 at 7:35 AM.  Refer to fax documents in pharmacy for further information.

## 2022-02-08 ENCOUNTER — HOSPITAL ENCOUNTER (OUTPATIENT)
Dept: INFUSION THERAPY | Age: 87
Discharge: HOME | End: 2022-02-08
Payer: MEDICARE

## 2022-02-08 VITALS
OXYGEN SATURATION: 98 % | DIASTOLIC BLOOD PRESSURE: 71 MMHG | SYSTOLIC BLOOD PRESSURE: 167 MMHG | TEMPERATURE: 98.5 F | RESPIRATION RATE: 18 BRPM | HEART RATE: 80 BPM

## 2022-02-08 LAB
ANION GAP BLD CALC-SCNC: 7 MMOL/L (ref 10–20)
CA-I BLD-MCNC: 1.18 MMOL/L (ref 1.12–1.32)
CHLORIDE BLD-SCNC: 108 MMOL/L (ref 98–107)
CO2 BLD-SCNC: 27.3 MMOL/L (ref 21–32)
CREAT BLD-MCNC: 1.39 MG/DL (ref 0.6–1.3)
GLUCOSE BLD-MCNC: 71 MG/DL (ref 65–100)
MAGNESIUM SERPL-MCNC: 2.6 MG/DL (ref 1.6–2.4)
PHOSPHATE SERPL-MCNC: 4 MG/DL (ref 2.6–4.7)
POTASSIUM BLD-SCNC: 4.7 MMOL/L (ref 3.5–5.1)
SERVICE CMNT-IMP: ABNORMAL
SODIUM BLD-SCNC: 141 MMOL/L (ref 136–145)

## 2022-02-08 PROCEDURE — 83735 ASSAY OF MAGNESIUM: CPT

## 2022-02-08 PROCEDURE — 96372 THER/PROPH/DIAG INJ SC/IM: CPT

## 2022-02-08 PROCEDURE — 74011250636 HC RX REV CODE- 250/636: Performed by: FAMILY MEDICINE

## 2022-02-08 PROCEDURE — 80047 BASIC METABLC PNL IONIZED CA: CPT

## 2022-02-08 PROCEDURE — 36415 COLL VENOUS BLD VENIPUNCTURE: CPT

## 2022-02-08 PROCEDURE — 84100 ASSAY OF PHOSPHORUS: CPT

## 2022-02-08 RX ADMIN — DENOSUMAB 60 MG: 60 INJECTION SUBCUTANEOUS at 10:56

## 2022-02-08 NOTE — PROGRESS NOTES
[facility] Progress Note    Date: 2022    Name: Sabra Kramer    MRN: [id number]         : 3/30/1927    Ms. Marin Arrived ambulatory and in no distress for Prolia Injection. Assessment was completed, no acute issues at this time, no new complaints voiced. Labs drawn peripherally and sent for processing. Patient denies recent dental work. Ms. Marin's vitals were reviewed. Visit Vitals  BP (!) 167/71   Pulse 80   Temp 98.5 °F (36.9 °C)   Resp 18   SpO2 98%   Breastfeeding No     Recent Results (from the past 8 hour(s))   POC CHEM8    Collection Time: 22 10:47 AM   Result Value Ref Range    Calcium, ionized (POC) 1.18 1.12 - 1.32 mmol/L    Sodium (POC) 141 136 - 145 mmol/L    Potassium (POC) 4.7 3.5 - 5.1 mmol/L    Chloride (POC) 108 (H) 98 - 107 mmol/L    CO2 (POC) 27.3 21 - 32 mmol/L    Anion gap (POC) 7 (L) 10 - 20 mmol/L    Glucose (POC) 71 65 - 100 mg/dL    Creatinine (POC) 1.39 (H) 0.6 - 1.3 mg/dL    GFRAA, POC 43 (L) >60 ml/min/1.73m2    GFRNA, POC 35 (L) >60 ml/min/1.73m2    Comment Comment Not Indicated. Medications:  Medications Administered     denosumab (PROLIA) injection 60 mg     Admin Date  2022 Action  Given Dose  60 mg Route  SubCUTAneous Administered By  Fabian Coffey RN                Ms. Carlin Clark tolerated treatment well and was discharged from [place] [address] in stable condition. She is aware of future appointments.     Future Appointments   Date Time Provider David Malloy   2022  1:00 PM SS INF7 CH3 <1H [facility], RN  2022

## 2022-02-10 ENCOUNTER — APPOINTMENT (OUTPATIENT)
Dept: INFUSION THERAPY | Age: 87
End: 2022-02-10

## 2022-03-19 PROBLEM — L97.219 VARICOSE VEINS OF RIGHT LOWER EXTREMITY WITH ULCER OF CALF (HCC): Status: ACTIVE | Noted: 2019-12-03

## 2022-03-19 PROBLEM — I83.012 VARICOSE VEINS OF RIGHT LOWER EXTREMITY WITH ULCER OF CALF (HCC): Status: ACTIVE | Noted: 2019-12-03

## 2022-03-20 PROBLEM — I87.2 PERIPHERAL VENOUS INSUFFICIENCY: Status: ACTIVE | Noted: 2019-12-03

## 2022-08-08 ENCOUNTER — HOSPITAL ENCOUNTER (OUTPATIENT)
Dept: INFUSION THERAPY | Age: 87
Discharge: HOME | End: 2022-08-08
Payer: MEDICARE

## 2022-08-08 VITALS
DIASTOLIC BLOOD PRESSURE: 61 MMHG | HEART RATE: 70 BPM | SYSTOLIC BLOOD PRESSURE: 170 MMHG | OXYGEN SATURATION: 99 % | TEMPERATURE: 98.2 F | WEIGHT: 120.6 LBS | BODY MASS INDEX: 25.21 KG/M2 | RESPIRATION RATE: 18 BRPM

## 2022-08-08 LAB
ANION GAP BLD CALC-SCNC: 8 MMOL/L (ref 10–20)
CA-I BLD-MCNC: 1.28 MMOL/L (ref 1.12–1.32)
CHLORIDE BLD-SCNC: 107 MMOL/L (ref 98–107)
CO2 BLD-SCNC: 27.9 MMOL/L (ref 21–32)
CREAT BLD-MCNC: 1.28 MG/DL (ref 0.6–1.3)
GLUCOSE BLD-MCNC: 65 MG/DL (ref 65–100)
MAGNESIUM SERPL-MCNC: 2.3 MG/DL (ref 1.6–2.4)
PHOSPHATE SERPL-MCNC: 4.3 MG/DL (ref 2.6–4.7)
POTASSIUM BLD-SCNC: 4.3 MMOL/L (ref 3.5–5.1)
SERVICE CMNT-IMP: ABNORMAL
SODIUM BLD-SCNC: 142 MMOL/L (ref 136–145)

## 2022-08-08 PROCEDURE — 74011250636 HC RX REV CODE- 250/636: Performed by: FAMILY MEDICINE

## 2022-08-08 PROCEDURE — 83735 ASSAY OF MAGNESIUM: CPT

## 2022-08-08 PROCEDURE — 84100 ASSAY OF PHOSPHORUS: CPT

## 2022-08-08 PROCEDURE — 96372 THER/PROPH/DIAG INJ SC/IM: CPT

## 2022-08-08 PROCEDURE — 80047 BASIC METABLC PNL IONIZED CA: CPT

## 2022-08-08 PROCEDURE — 36415 COLL VENOUS BLD VENIPUNCTURE: CPT

## 2022-08-08 RX ADMIN — DENOSUMAB 60 MG: 60 INJECTION SUBCUTANEOUS at 13:51

## 2022-08-08 NOTE — PROGRESS NOTES
Outpatient Infusion Center Short Visit Progress Note    Patient admitted to [facility] for prolia ambulatory with walker in stable condition. Assessment completed. Josselin Lindsey states she recently had UTI but no symptoms noted. No dental work to be done or recently done. Covid Screening  1. Do you have any symptoms of COVID-19? SOB, coughing, fever, or generally not feeling well ? no  2. Have you been exposed to COVID-19 recently? no  3. Have you had any recent contact with family/friend that has a pending COVID test? no    Vital Signs:  Visit Vitals  BP (!) 170/61   Pulse 70   Temp 98.2 °F (36.8 °C)   Resp 18   Wt 54.7 kg (120 lb 9.6 oz)   SpO2 99%   Breastfeeding No   BMI 25.21 kg/m²         R arm with positive blood return. Lab Results:  Recent Results (from the past 12 hour(s))   POC CHEM8    Collection Time: 08/08/22  1:41 PM   Result Value Ref Range    Calcium, ionized (POC) 1.28 1.12 - 1.32 mmol/L    Sodium (POC) 142 136 - 145 mmol/L    Potassium (POC) 4.3 3.5 - 5.1 mmol/L    Chloride (POC) 107 98 - 107 mmol/L    CO2 (POC) 27.9 21 - 32 mmol/L    Anion gap (POC) 8 (L) 10 - 20 mmol/L    Glucose (POC) 65 65 - 100 mg/dL    Creatinine (POC) 1.28 0.6 - 1.3 mg/dL    GFRAA, POC 47 (L) >60 ml/min/1.73m2    GFRNA, POC 39 (L) >60 ml/min/1.73m2    Comment Comment Not Indicated. Medications:  Medications Administered       denosumab (PROLIA) injection 60 mg       Admin Date  08/08/2022 Action  Given Dose  60 mg Route  SubCUTAneous Administered By  Vamshi Ha                      Left arm SQ      Patient tolerated treatment well. Patient discharged from [place] [address] ambulatory in no distress. Patient aware of next appointment.     Pasha Barone RN    Future Appointments   Date Time Provider David Malloy   2/20/2023 10:00 AM [facility]   [facility]/21/2023 10:00 [facility]

## 2023-02-10 ENCOUNTER — TELEPHONE (OUTPATIENT)
Dept: ENT CLINIC | Age: 88
End: 2023-02-10

## 2023-02-10 NOTE — TELEPHONE ENCOUNTER
Pt called stating she is having tremendous ear pain that is extending into her neck. Pt stated she has been seen by Dr. Beatriz Swain but I was unable to find any evidence of past appointments.     Please advise on an appt

## 2023-02-13 NOTE — CALL BACK NOTE
New/updated order required for patient's upcoming [facility] appointment. Faxed doctor's office on 02/13/23 at 9:39 AM.  Refer to fax documents in pharmacy for further information.

## 2023-02-15 NOTE — CALL BACK NOTE
New/updated order required for patient's upcoming [facility] appointment. Faxed doctor's office on 02/15/23 at 7:29 AM.  Refer to fax documents in pharmacy for further information.

## 2023-02-20 ENCOUNTER — HOSPITAL ENCOUNTER (OUTPATIENT)
Dept: INFUSION THERAPY | Age: 88
Discharge: HOME | End: 2023-02-20
Payer: MEDICARE

## 2023-02-20 VITALS
SYSTOLIC BLOOD PRESSURE: 156 MMHG | WEIGHT: 115 LBS | RESPIRATION RATE: 18 BRPM | HEART RATE: 70 BPM | OXYGEN SATURATION: 99 % | TEMPERATURE: 97.3 F | DIASTOLIC BLOOD PRESSURE: 55 MMHG | BODY MASS INDEX: 24.04 KG/M2

## 2023-02-20 LAB
ANION GAP BLD CALC-SCNC: 11 MMOL/L (ref 10–20)
CA-I BLD-MCNC: 1.18 MMOL/L (ref 1.12–1.32)
CHLORIDE BLD-SCNC: 104 MMOL/L (ref 98–107)
CO2 BLD-SCNC: 28.1 MMOL/L (ref 21–32)
CREAT BLD-MCNC: 1.21 MG/DL (ref 0.6–1.3)
GLUCOSE BLD-MCNC: 82 MG/DL (ref 65–100)
MAGNESIUM SERPL-MCNC: 2.4 MG/DL (ref 1.6–2.4)
PHOSPHATE SERPL-MCNC: 3.2 MG/DL (ref 2.6–4.7)
POTASSIUM BLD-SCNC: 3.9 MMOL/L (ref 3.5–5.1)
SERVICE CMNT-IMP: ABNORMAL
SODIUM BLD-SCNC: 142 MMOL/L (ref 136–145)

## 2023-02-20 PROCEDURE — 36415 COLL VENOUS BLD VENIPUNCTURE: CPT

## 2023-02-20 PROCEDURE — 96372 THER/PROPH/DIAG INJ SC/IM: CPT

## 2023-02-20 PROCEDURE — 83735 ASSAY OF MAGNESIUM: CPT

## 2023-02-20 PROCEDURE — 80047 BASIC METABLC PNL IONIZED CA: CPT

## 2023-02-20 PROCEDURE — 84100 ASSAY OF PHOSPHORUS: CPT

## 2023-02-20 PROCEDURE — 74011250636 HC RX REV CODE- 250/636: Performed by: FAMILY MEDICINE

## 2023-02-20 RX ADMIN — DENOSUMAB 60 MG: 60 INJECTION SUBCUTANEOUS at 11:05

## 2023-02-20 NOTE — PROGRESS NOTES
OUTPATIENT INFUSION CENTER    DISCHARGE INSTRUCTIONS FOR:  RECLAST (ZOLEDRONIC ACID):    1.  Be sure to inform your Dentist that you are taking this drug prior to invasive dental procedures. You should avoid major dental work for a while after you are treated with this medicine. Report any signs/symptoms of jaw pain to your physician immediately. 2.  Your doctor may order lab testing before each yearly dose of Reclast to check your calcium and kidney function. Your doctor may also prescribe Vitamin D and Calcium supplements. 3.  Make sure your doctor knows if you are taking fluid pills, arthritis medicines or antibiotics,  or if you have a history of problems with your gums, mouth or teeth. 4.  Drink some extra fluids during the 12-hour period before and after you receive Reclast.  Report any changes in urinary patterns (example: a decrease in how often you urinate). Also report rapid weight gain, swelling of the hands, ankles or feet, unusual tiredness or weakness or unusual bleeding or bruising. 5.  You may resume your normal activities after your infusion. Some people may have mild flu-like side effects from Reclast, especially with the first dose. These side effects are less common with subsequent doses. These may include:    Mild nausea, diarrhea, constipation or upset stomach; Red or irritated eyes; redness or itching at IV site;  Mild skin rash, mild numbness, tingling, or burning in extremities; Headache, dizziness, trouble sleeping, or mild muscle or joint pain, which can be relieved with a mild pain reliever such as Tyenol or Ibuprofen as directed by your physician;  Nasal congestion, runny nose, sneezing. 6.  Signs/Symptoms of an allergic reaction may require immediate medical attention. These are rare, but may include one or more of the following:     Skin - Swelling, blistering, weeping, crusting, rash, itching or;   Wheezing, cough, sore throat, chest tightness, chest pain or shortness of breath, irregular heart beat;  Swelling of the face, eyelids, lips, tongue, or throat; severe dry mouth; Severe red (bloodshot), itchy, swollen, watery or painful eyes;  Stomach pain, loss of appetite, nausea, vomiting, or bloody diarrhea;  Severe headache, sleepiness or changes in personality;  Numbness, tingling or pain around the mouth, teeth, or jaw. Contact your physician if you have questions or concerns, or experience any of the above symptoms.     Galina Dominguez, Signature: ______________________________ 2/20/2023  Brad Mcrae RN

## 2023-02-20 NOTE — PROGRESS NOTES
[facility]fusion [facility] Short Visit Progress Note    56 Ms. Marin admitted to [facility] for [facility] ambulatory  with walker assistin stable condition. Assessment completed. No dental work planned. Vital Signs:  Visit Vitals  BP (!) 156/55   Pulse 70   Temp 97.3 °F (36.3 °C)   Resp 18   Wt 52.2 kg (115 lb)   SpO2 99%   Breastfeeding No   BMI 24.04 kg/m²           Lab Results:  Recent Results (from the past 12 hour(s))   POC CHEM8    Collection Time: 02/20/23 10:55 AM   Result Value Ref Range    Calcium, ionized (POC) 1.18 1.12 - 1.32 mmol/L    Sodium (POC) 142 136 - 145 mmol/L    Potassium (POC) 3.9 3.5 - 5.1 mmol/L    Chloride (POC) 104 98 - 107 mmol/L    CO2 (POC) 28.1 21 - 32 mmol/L    Anion gap (POC) 11 10 - 20 mmol/L    Glucose (POC) 82 65 - 100 mg/dL    Creatinine (POC) 1.21 0.6 - 1.3 mg/dL    eGFR (POC) 41 (L) >60 ml/min/1.73m2    Comment Comment Not Indicated. Medications:    Medications Administered       denosumab (PROLIA) injection 60 mg       Admin Date  02/20/2023 Action  Given Dose  60 mg Route  SubCUTAneous Administered By  Kamla Corcoran RN                     Declined AVS    Patient tolerated treatment well. Patient discharged from [facility] ambulatory  with walker assisti n no distress at 1120. Patient aware of next appointment.     Maritza Jeffers RN  February 20, 2023    Future Appointments   Date Time Provider David Malloy   2/22/2023 11:30 AM Ester Patten NP REP BS AMB   8/21/2023 10:00 AM SS INF7 CH3 <1H [facility] Yosi Martinez

## 2023-02-22 ENCOUNTER — OFFICE VISIT (OUTPATIENT)
Dept: ENT CLINIC | Age: 88
End: 2023-02-22
Payer: MEDICARE

## 2023-02-22 VITALS
HEART RATE: 71 BPM | OXYGEN SATURATION: 99 % | HEIGHT: 58 IN | DIASTOLIC BLOOD PRESSURE: 68 MMHG | BODY MASS INDEX: 24.04 KG/M2 | SYSTOLIC BLOOD PRESSURE: 108 MMHG

## 2023-02-22 DIAGNOSIS — S16.1XXA STRAIN OF STERNOCLEIDOMASTOID MUSCLE, INITIAL ENCOUNTER: Primary | ICD-10-CM

## 2023-02-22 PROCEDURE — 1123F ACP DISCUSS/DSCN MKR DOCD: CPT | Performed by: NURSE PRACTITIONER

## 2023-02-22 PROCEDURE — 99202 OFFICE O/P NEW SF 15 MIN: CPT | Performed by: NURSE PRACTITIONER

## 2023-02-22 RX ORDER — DOCUSATE SODIUM 100 MG/1
CAPSULE, LIQUID FILLED ORAL
COMMUNITY

## 2023-02-22 RX ORDER — FUROSEMIDE 20 MG/1
TABLET ORAL
COMMUNITY

## 2023-02-22 RX ORDER — TRAMADOL HYDROCHLORIDE AND ACETAMINOPHEN 37.5; 325 MG/1; MG/1
TABLET, FILM COATED ORAL
COMMUNITY
Start: 2022-07-05

## 2023-02-22 RX ORDER — PANTOPRAZOLE SODIUM 20 MG/1
TABLET, DELAYED RELEASE ORAL
COMMUNITY

## 2023-02-22 NOTE — PROGRESS NOTES
Subjective:    Jossy Jeffers   80 y.o.   3/30/1927     New Patient Visit  This is a 80 y.o. female who is here today for right sided neck pain that radiates to the ear. She has not noticed and particular aggrivating factors, no alleviating factors. She states she has had a pulled muscle in her neck for years. She denies drainage or otalgia, she points to her mastoid on the right when she is showing me. She had a myringtomy about three years ago to [facility] the pressure\" in her ears to help with tinnitus followed by patches being placed over the incision, she states she had this done twice and that the \"patches\" failed both times. She had to have surgery again to close the hole. Review of Systems  Review of Systems   Constitutional: Negative. HENT:  Positive for ear pain and hearing loss. Negative for congestion, ear discharge, nosebleeds, sinus pain, sore throat and tinnitus. Wears hearing aids    Eyes: Negative. Respiratory: Negative. Negative for stridor. Cardiovascular: Negative. Gastrointestinal: Negative. Genitourinary: Negative. Musculoskeletal:  Positive for myalgias. Skin: Negative. Neurological: Negative. Endo/Heme/Allergies: Negative. Psychiatric/Behavioral: Negative.          Past Medical History:   Diagnosis Date    Arrhythmia     H/O A-FIB    Arthritis     Cancer ([facility] [address]     h/o skin cancer on neck, forehead, L and R leg    CKD (chronic kidney disease), stage II     Corkscrew esophagus     GERD (gastroesophageal reflux disease)     Heart failure (HCC)     Hyperlipidemia     Hypertension     Hypothyroidism     Ill-defined condition 2015    RIGHT EAR TUBE MILD HEARING LOSS    Sleep apnea     WEARS GUARD     Past Surgical History:   Procedure Laterality Date    HX GI      1983 gallbladder removed    HX GYN      tubal ligation 1961    HX GYN      2009 ovary cyst removed    HX GYN      D&C    HX HEENT      2012 L eye cataract removed    HX HYSTERECTOMY  2009 HX ORTHOPAEDIC      R foot surgery    HX OTHER SURGICAL      1984 neck cyst removed    HX OTHER SURGICAL      1992 cystocele and rectocele    HX OTHER SURGICAL      1991 plastic surgery for L and R leg skin cancer    HX PACEMAKER      HX UROLOGICAL      bladder lift 1961      Family History   Problem Relation Age of Onset    Hypertension Mother     Heart Disease Father      Social History     Tobacco Use    Smoking status: Never    Smokeless tobacco: Never   Substance Use Topics    Alcohol use: No      Prior to Admission medications    Medication Sig Start Date End Date Taking? Authorizing Provider   traMADol-acetaminophen (ULTRACET) 37.5-325 mg per tablet tramadol 37.5 mg-acetaminophen 325 mg tablet   TAKE ONE TABLET BY MOUTH every 4 hours WITH MEALS 7/5/22  Yes Provider, Historical   furosemide (LASIX) 20 mg tablet furosemide 20 mg tablet   Take 2 tablets every day by oral route in the morning for 30 days. Yes Provider, Historical   docusate sodium (COLACE) 100 mg capsule Stool Softener 100 mg capsule   Take 1 capsule every day by oral route. Yes Provider, Historical   pantoprazole (PROTONIX) 20 mg tablet pantoprazole 20 mg tablet,delayed release   Take 1 tablet every day by oral route. Yes Provider, Historical   sennosides 8.6 mg cap Take  by mouth. Yes Provider, Historical   OTHER Take  by mouth daily. Yes Provider, Historical   Cholecalciferol, Vitamin D3, 3,000 unit tab Take  by mouth. Yes Provider, Historical   folic acid (FOLVITE) 1 mg tablet Take  by mouth daily. Yes Provider, Historical   vit A/vit C/vit E/zinc/copper (PRESERVISION AREDS PO) Take  by mouth. Yes Provider, Historical   cyanocobalamin (VITAMIN B12) 1,000 mcg/mL injection 1,000 mcg by IntraMUSCular route. EVERY TWO WEEKS   Yes Provider, Historical   levothyroxine (SYNTHROID) 75 mcg tablet Take 88 mcg by mouth Daily (before breakfast). Yes Provider, Historical   magnesium 250 mg Tab Take 1 Tab by mouth two (2) times a day. Yes Provider, Historical   nitroglycerin (NITROSTAT) 0.3 mg SL tablet by SubLINGual route every five (5) minutes as needed. Yes Provider, Historical   amitriptyline HCl (AMITRIPTYLINE PO) Take  by mouth once. Patient not taking: Reported on 2/22/2023    Provider, Historical   methscopolamine (PAMINE) 2.5 mg tab tablet Take  by mouth. Patient not taking: Reported on 2/22/2023    Provider, Historical   dilTIAZem ER (CARDIZEM LA) 120 mg tablet Take 180 mg by mouth daily. Patient not taking: Reported on 2/22/2023    Provider, Historical   spironolactone (ALDACTONE) 25 mg tablet Take 12.5 mg by mouth daily. Patient not taking: Reported on 2/22/2023    Provider, Historical   dextran 70-hypromellose (ARTIFICIAL TEARS) ophthalmic solution Administer 2 Drops to both eyes as needed. Patient not taking: Reported on 2/22/2023    Provider, Historical   HYDROcodone-acetaminophen (NORCO) 5-325 mg per tablet Take 1-2 Tabs by mouth every four (4) hours as needed for Pain. Max Daily Amount: 12 Tabs. Patient not taking: Reported on 2/22/2023 10/23/18   Farooq Macdonald MD   coenzyme q10 10 mg cap Take  by mouth. Patient not taking: Reported on 2/22/2023    Provider, Historical   B.animalis,bifid,infantis,long 10-15 mg TbEC Take  by mouth. Patient not taking: Reported on 2/22/2023    Provider, Historical   aspirin 81 mg chewable tablet Take 1 Tab by mouth daily. Patient not taking: Reported on 2/22/2023 3/18/15   Cristhian Siddiqui MD   simvastatin (ZOCOR) 5 mg tablet Take 5 mg by mouth nightly. Patient not taking: Reported on 2/22/2023    Provider, Historical   simethicone (MYLICON) 80 mg chewable tablet Take 80 mg by mouth every six (6) hours as needed for Flatulence.   Patient not taking: Reported on 2/22/2023    Provider, Historical        Allergies   Allergen Reactions    Penicillins Rash    Polymyxin B Sulf-Trimethoprim Other (comments)     RED, ITCHY EYES    Sulfa (Sulfonamide Antibiotics) Rash         Objective: Visit Vitals  /68 (BP 1 Location: Left upper arm, BP Patient Position: Sitting, BP Cuff Size: Adult)   Pulse 71   Ht 4' 10\" (1.473 m)   SpO2 99%   BMI 24.04 kg/m²        Physical Exam  Constitutional:       General: She is not in acute distress. Appearance: Normal appearance. She is normal weight. She is not ill-appearing, toxic-appearing or diaphoretic. HENT:      Head: Normocephalic and atraumatic. Right Ear: Tympanic membrane, ear canal and external ear normal. There is no impacted cerumen. Left Ear: Tympanic membrane, ear canal and external ear normal. There is no impacted cerumen. Nose: Nose normal. No congestion or rhinorrhea. Mouth/Throat:      Mouth: Mucous membranes are moist.      Pharynx: Oropharynx is clear. No oropharyngeal exudate or posterior oropharyngeal erythema. Eyes:      General: No scleral icterus. Right eye: No discharge. Left eye: No discharge. Extraocular Movements: Extraocular movements intact. Pupils: Pupils are equal, round, and reactive to light. Neck:      Comments: Tenderness to palpation over right SCM  Cardiovascular:      Rate and Rhythm: Normal rate and regular rhythm. Pulmonary:      Effort: Pulmonary effort is normal.      Breath sounds: Normal breath sounds. Abdominal:      General: Abdomen is flat. Musculoskeletal:         General: Normal range of motion. Cervical back: Tenderness present. Skin:     Capillary Refill: Capillary refill takes less than 2 seconds. Neurological:      General: No focal deficit present. Mental Status: She is alert and oriented to person, place, and time. Mental status is at baseline. Comments: Wheelchair bound at baseline   Psychiatric:         Mood and Affect: Mood normal.         Behavior: Behavior normal.         Thought Content: Thought content normal.         Judgment: Judgment normal.       Assessment/Plan:     Encounter Diagnoses   Name Primary?     Strain of sternocleidomastoid muscle, initial encounter Yes     Orders Placed This Encounter    traMADol-acetaminophen (ULTRACET) 37.5-325 mg per tablet    furosemide (LASIX) 20 mg tablet    docusate sodium (COLACE) 100 mg capsule    pantoprazole (PROTONIX) 20 mg tablet     Sternocledomastoid Strain   Recommend warm moist compress to help with pain   Diclofenac gel as needed   NSAID or acetaminophen for pain       Thank you for referring this patient,    Joao Walter AGACN[facility]     [address]  [phone]

## 2023-02-22 NOTE — PROGRESS NOTES
Chief Complaint   Patient presents with    New Patient    Ear Pain     Héctor pain right side worse      Visit Vitals  /68 (BP 1 Location: Left upper arm, BP Patient Position: Sitting, BP Cuff Size: Adult)   Pulse 71   Ht 4' 10\" (1.473 m)   SpO2 99%   BMI 24.04 kg/m²

## 2023-06-17 ENCOUNTER — HOSPITAL ENCOUNTER (EMERGENCY)
Facility: HOSPITAL | Age: 88
Discharge: HOME | End: 2023-06-17
Attending: FAMILY MEDICINE
Payer: MEDICARE

## 2023-06-17 VITALS
HEIGHT: 56 IN | OXYGEN SATURATION: 96 % | TEMPERATURE: 98.9 F | SYSTOLIC BLOOD PRESSURE: 114 MMHG | RESPIRATION RATE: 18 BRPM | WEIGHT: 105 LBS | DIASTOLIC BLOOD PRESSURE: 65 MMHG | BODY MASS INDEX: 23.62 KG/M2 | HEART RATE: 73 BPM

## 2023-06-17 DIAGNOSIS — N30.91 CYSTITIS WITH HEMATURIA: Primary | ICD-10-CM

## 2023-06-17 LAB
APPEARANCE UR: ABNORMAL
BACTERIA URNS QL MICRO: ABNORMAL /HPF
BILIRUB UR QL: NEGATIVE
COLOR UR: ABNORMAL
EPITH CASTS URNS QL MICRO: ABNORMAL /LPF
GLUCOSE UR STRIP.AUTO-MCNC: NEGATIVE MG/DL
HGB UR QL STRIP: ABNORMAL
KETONES UR QL STRIP.AUTO: NEGATIVE MG/DL
LEUKOCYTE ESTERASE UR QL STRIP.AUTO: ABNORMAL
NITRITE UR QL STRIP.AUTO: NEGATIVE
PH UR STRIP: 6.5 (ref 5–8)
PROT UR STRIP-MCNC: ABNORMAL MG/DL
RBC #/AREA URNS HPF: ABNORMAL /HPF (ref 0–5)
SP GR UR REFRACTOMETRY: 1.01 (ref 1–1.03)
URINE CULTURE IF INDICATED: ABNORMAL
UROBILINOGEN UR QL STRIP.AUTO: 0.1 EU/DL (ref 0.2–1)
WBC URNS QL MICRO: >100 /HPF (ref 0–4)

## 2023-06-17 PROCEDURE — 99283 EMERGENCY DEPT VISIT LOW MDM: CPT

## 2023-06-17 PROCEDURE — 87077 CULTURE AEROBIC IDENTIFY: CPT

## 2023-06-17 PROCEDURE — 87186 SC STD MICRODIL/AGAR DIL: CPT

## 2023-06-17 PROCEDURE — 6370000000 HC RX 637 (ALT 250 FOR IP): Performed by: FAMILY MEDICINE

## 2023-06-17 PROCEDURE — 81001 URINALYSIS AUTO W/SCOPE: CPT

## 2023-06-17 PROCEDURE — 87086 URINE CULTURE/COLONY COUNT: CPT

## 2023-06-17 RX ORDER — CEPHALEXIN 500 MG/1
500 CAPSULE ORAL ONCE
Status: COMPLETED | OUTPATIENT
Start: 2023-06-17 | End: 2023-06-17

## 2023-06-17 RX ORDER — CEPHALEXIN 500 MG/1
500 CAPSULE ORAL 2 TIMES DAILY
Qty: 14 CAPSULE | Refills: 0 | Status: SHIPPED | OUTPATIENT
Start: 2023-06-17 | End: 2023-06-24

## 2023-06-17 RX ADMIN — CEPHALEXIN 500 MG: 500 CAPSULE ORAL at 17:39

## 2023-06-17 ASSESSMENT — PAIN - FUNCTIONAL ASSESSMENT: PAIN_FUNCTIONAL_ASSESSMENT: NONE - DENIES PAIN

## 2023-06-17 NOTE — DISCHARGE INSTRUCTIONS
Thank you! Thank you for allowing me to care for you in the emergency department. It is my goal to provide you with excellent care. If you have not received excellent quality care, please ask to speak to the nurse manager. Please fill out the survey that will come to you by mail or email since we listen to your feedback! Below you will find a list of your tests from today's visit. Should you have any questions, please do not hesitate to call the emergency department. Labs  Recent Results (from the past 12 hour(s))   Urinalysis with Reflex to Culture    Collection Time: 06/17/23  4:50 PM    Specimen: Urine   Result Value Ref Range    Color, UA Yellow/Straw      Appearance Cloudy (A) Clear      Specific Gravity, UA 1.010 1.003 - 1.030      pH, Urine 6.5 5.0 - 8.0      Protein, UA Trace (A) Negative mg/dL    Glucose, UA Negative Negative mg/dL    Ketones, Urine Negative Negative mg/dL    Bilirubin Urine Negative Negative      Blood, Urine Large (A) Negative      Urobilinogen, Urine 0.1 (L) 0.2 - 1.0 EU/dL    Nitrite, Urine Negative Negative      Leukocyte Esterase, Urine Large (A) Negative      WBC, UA >100 (H) 0 - 4 /hpf    RBC, UA  0 - 5 /hpf    Epithelial Cells UA Few Few /lpf    BACTERIA, URINE 1+ (A) Negative /hpf    Urine Culture if Indicated Urine Culture Ordered (A) Culture not indicated by UA result         Radiologic Studies  No orders to display     ------------------------------------------------------------------------------------------------------------  The exam and treatment you received in the Emergency Department were for an urgent problem and are not intended as complete care. It is important that you follow-up with a doctor, nurse practitioner, or physician assistant to:  (1) confirm your diagnosis,  (2) re-evaluation of changes in your illness and treatment, and  (3) for ongoing care. Please take your discharge instructions with you when you go to your follow-up appointment.

## 2023-06-20 LAB
BACTERIA SPEC CULT: ABNORMAL
COLONY COUNT, CNT: ABNORMAL
Lab: ABNORMAL

## 2023-06-22 ENCOUNTER — OFFICE VISIT (OUTPATIENT)
Age: 88
End: 2023-06-22
Payer: MEDICARE

## 2023-06-22 VITALS — HEIGHT: 56 IN | HEART RATE: 70 BPM | WEIGHT: 105 LBS | BODY MASS INDEX: 23.62 KG/M2 | OXYGEN SATURATION: 98 %

## 2023-06-22 DIAGNOSIS — H61.23 BILATERAL IMPACTED CERUMEN: ICD-10-CM

## 2023-06-22 DIAGNOSIS — H90.3 SENSORINEURAL HEARING LOSS (SNHL) OF BOTH EARS: Primary | ICD-10-CM

## 2023-06-22 PROCEDURE — 1123F ACP DISCUSS/DSCN MKR DOCD: CPT | Performed by: NURSE PRACTITIONER

## 2023-06-22 PROCEDURE — 69210 REMOVE IMPACTED EAR WAX UNI: CPT | Performed by: NURSE PRACTITIONER

## 2023-06-22 PROCEDURE — G8420 CALC BMI NORM PARAMETERS: HCPCS | Performed by: NURSE PRACTITIONER

## 2023-06-22 PROCEDURE — 1036F TOBACCO NON-USER: CPT | Performed by: NURSE PRACTITIONER

## 2023-06-22 PROCEDURE — 1090F PRES/ABSN URINE INCON ASSESS: CPT | Performed by: NURSE PRACTITIONER

## 2023-06-22 PROCEDURE — 99212 OFFICE O/P EST SF 10 MIN: CPT | Performed by: NURSE PRACTITIONER

## 2023-06-22 PROCEDURE — G8427 DOCREV CUR MEDS BY ELIG CLIN: HCPCS | Performed by: NURSE PRACTITIONER

## 2023-06-22 ASSESSMENT — ENCOUNTER SYMPTOMS
ABDOMINAL PAIN: 0
EYES NEGATIVE: 1
NAUSEA: 0
CHEST TIGHTNESS: 0
COUGH: 0
ALLERGIC/IMMUNOLOGIC NEGATIVE: 1
SHORTNESS OF BREATH: 0
DIARRHEA: 0
VOMITING: 0

## 2023-06-22 NOTE — PROGRESS NOTES
Subjective:    Apryl Felix   80 y.o.   3/30/1927     Followup Visit  This is a 80 y.o. female who is here today to discuss issues with her ears. Location - bilateral ears     Quality - impacted cerumen     Severity -  mild    Duration - unknown    Timing - constant    Context - she states se was seen by her audiologist and was told she needed her ears cleaned before they could service her hearing aids. Modifying Features - none    Associated symptoms/signs - decreased hearing       Review of Systems  Review of Systems   Constitutional: Negative. Eyes: Negative. Respiratory:  Negative for cough, chest tightness and shortness of breath. Cardiovascular:  Negative for chest pain and palpitations. Gastrointestinal:  Negative for abdominal pain, diarrhea, nausea and vomiting. Endocrine: Negative. Genitourinary: Negative. Musculoskeletal:  Positive for arthralgias, myalgias and neck pain. Skin: Negative. Allergic/Immunologic: Negative. Neurological: Negative. Hematological: Negative. Psychiatric/Behavioral: Negative. Past Medical History:   Diagnosis Date    Arrhythmia     H/O A-FIB    Arthritis     Cancer ([facility] [address]     h/o skin cancer on neck, forehead, L and R leg    CKD (chronic kidney disease), stage II     Corkscrew esophagus     GERD (gastroesophageal reflux disease)     Heart failure (HCC)     Hyperlipidemia     Hypertension     Hypothyroidism     Ill-defined condition 2015    RIGHT EAR TUBE MILD HEARING LOSS    Sleep apnea     WEARS GUARD     Prior to Admission medications    Medication Sig Start Date End Date Taking?  Authorizing Provider   cephALEXin (KEFLEX) 500 MG capsule Take 1 capsule by mouth 2 times daily for 7 days 6/17/23 6/24/23  Veda Crockett DO   aspirin 81 MG chewable tablet Take 81 mg by mouth daily 3/18/15   Ar Automatic Reconciliation   Cholecalciferol 75 MCG (3000 UT) TABS Take by mouth    Ar Automatic Reconciliation   Coenzyme Q10 10 MG CAPS

## 2023-07-01 ENCOUNTER — HOSPITAL ENCOUNTER (EMERGENCY)
Facility: HOSPITAL | Age: 88
Discharge: HOME | End: 2023-07-01
Attending: EMERGENCY MEDICINE
Payer: MEDICARE

## 2023-07-01 VITALS
DIASTOLIC BLOOD PRESSURE: 55 MMHG | HEART RATE: 70 BPM | SYSTOLIC BLOOD PRESSURE: 157 MMHG | BODY MASS INDEX: 24.07 KG/M2 | RESPIRATION RATE: 16 BRPM | TEMPERATURE: 98.1 F | WEIGHT: 107 LBS | OXYGEN SATURATION: 98 % | HEIGHT: 56 IN

## 2023-07-01 DIAGNOSIS — R30.0 DYSURIA: Primary | ICD-10-CM

## 2023-07-01 LAB
APPEARANCE UR: CLEAR
BACTERIA URNS QL MICRO: NEGATIVE /HPF
BILIRUB UR QL: NEGATIVE
COLOR UR: ABNORMAL
EPITH CASTS URNS QL MICRO: ABNORMAL /LPF
GLUCOSE UR STRIP.AUTO-MCNC: NEGATIVE MG/DL
HGB UR QL STRIP: ABNORMAL
KETONES UR QL STRIP.AUTO: NEGATIVE MG/DL
LEUKOCYTE ESTERASE UR QL STRIP.AUTO: ABNORMAL
NITRITE UR QL STRIP.AUTO: NEGATIVE
PH UR STRIP: 7 (ref 5–8)
PROT UR STRIP-MCNC: NEGATIVE MG/DL
RBC #/AREA URNS HPF: ABNORMAL /HPF (ref 0–5)
SP GR UR REFRACTOMETRY: 1.01 (ref 1–1.03)
UROBILINOGEN UR QL STRIP.AUTO: 0.1 EU/DL (ref 0.1–1)
WBC URNS QL MICRO: ABNORMAL /HPF (ref 0–4)

## 2023-07-01 PROCEDURE — 6370000000 HC RX 637 (ALT 250 FOR IP): Performed by: EMERGENCY MEDICINE

## 2023-07-01 PROCEDURE — 99283 EMERGENCY DEPT VISIT LOW MDM: CPT

## 2023-07-01 PROCEDURE — 81001 URINALYSIS AUTO W/SCOPE: CPT

## 2023-07-01 RX ORDER — CEPHALEXIN 500 MG/1
500 CAPSULE ORAL ONCE
Status: COMPLETED | OUTPATIENT
Start: 2023-07-01 | End: 2023-07-01

## 2023-07-01 RX ORDER — CEPHALEXIN 500 MG/1
500 CAPSULE ORAL 2 TIMES DAILY
Qty: 14 CAPSULE | Refills: 0 | Status: SHIPPED | OUTPATIENT
Start: 2023-07-01 | End: 2023-07-08

## 2023-07-01 RX ADMIN — CEPHALEXIN 500 MG: 500 CAPSULE ORAL at 22:26

## 2023-07-01 ASSESSMENT — LIFESTYLE VARIABLES
HOW OFTEN DO YOU HAVE A DRINK CONTAINING ALCOHOL: NEVER
HOW MANY STANDARD DRINKS CONTAINING ALCOHOL DO YOU HAVE ON A TYPICAL DAY: PATIENT DOES NOT DRINK

## 2023-07-03 NOTE — ED PROVIDER NOTES
[address] Emergency Care  EMERGENCY DEPARTMENT HISTORY AND PHYSICAL EXAM      Date: 7/1/2023  Patient Name: Emilia Flores  MRN: [id number]  YOB: 1927  Date of evaluation: 7/1/2023  Provider: Harmeet Quintana DO   Note Started: 9:03 AM EDT 7/3/23    HISTORY OF PRESENT ILLNESS     Chief Complaint   Patient presents with    Dysuria       History Provided By: Patient    HPI: Emilia Flores is a 80 y.o. female with past medical history significant for the below presenting to the emergency department for evaluation of dysuria. Patient reports symptoms started approximately 3 weeks ago. She received antibiotics with temporary improvement of symptoms, however states that symptoms shortly returned thereafter. Was placed back on antibiotic, however feels that symptoms are worsening. Son at bedside reports that this is a chronic, ongoing issue for the patient.     PAST MEDICAL HISTORY   Past Medical History:  Past Medical History:   Diagnosis Date    Arrhythmia     H/O A-FIB    Arthritis     Cancer ([address])     h/o skin cancer on neck, forehead, L and R leg    CKD (chronic kidney disease), stage II     Corkscrew esophagus     GERD (gastroesophageal reflux disease)     Heart failure ([address])     Hyperlipidemia     Hypertension     Hypothyroidism     Ill-defined condition 2015    RIGHT EAR TUBE MILD HEARING LOSS    Sleep apnea     WEARS GUARD       Past Surgical History:  Past Surgical History:   Procedure Laterality Date    GI      1983 gallbladder removed    GYN      D&C    GYN      tubal ligation 1961    GYN      2009 ovary cyst removed    HEENT      2012 L eye cataract removed    HYSTERECTOMY (CERVIX STATUS UNKNOWN)  2009    ORTHOPEDIC SURGERY      R foot surgery    OTHER SURGICAL HISTORY      1991 plastic surgery for L and R leg skin cancer    OTHER SURGICAL HISTORY      1992 cystocele and rectocele    OTHER SURGICAL HISTORY      1984 neck cyst removed    PACEMAKER

## 2023-08-14 PROBLEM — M81.0 OSTEOPOROSIS: Status: ACTIVE | Noted: 2023-08-14

## 2023-08-21 ENCOUNTER — APPOINTMENT (OUTPATIENT)
Dept: INFUSION THERAPY | Age: 88
End: 2023-08-21

## 2023-11-14 ENCOUNTER — HOSPITAL ENCOUNTER (EMERGENCY)
Facility: HOSPITAL | Age: 88
Discharge: HOME HEALTH | End: 2023-11-14
Payer: MEDICARE

## 2023-11-14 VITALS
OXYGEN SATURATION: 98 % | SYSTOLIC BLOOD PRESSURE: 154 MMHG | WEIGHT: 119 LBS | HEART RATE: 76 BPM | TEMPERATURE: 98 F | DIASTOLIC BLOOD PRESSURE: 76 MMHG | BODY MASS INDEX: 26.68 KG/M2 | RESPIRATION RATE: 17 BRPM

## 2023-11-14 DIAGNOSIS — S61.419A SKIN TEAR OF HAND WITHOUT COMPLICATION, INITIAL ENCOUNTER: ICD-10-CM

## 2023-11-14 DIAGNOSIS — S61.412A LACERATION OF LEFT HAND WITHOUT FOREIGN BODY, INITIAL ENCOUNTER: Primary | ICD-10-CM

## 2023-11-14 PROCEDURE — 6370000000 HC RX 637 (ALT 250 FOR IP)

## 2023-11-14 PROCEDURE — 12004 RPR S/N/AX/GEN/TRK7.6-12.5CM: CPT

## 2023-11-14 PROCEDURE — 99283 EMERGENCY DEPT VISIT LOW MDM: CPT

## 2023-11-14 RX ORDER — CEPHALEXIN 500 MG/1
500 CAPSULE ORAL 4 TIMES DAILY
Qty: 28 CAPSULE | Refills: 0 | Status: SHIPPED | OUTPATIENT
Start: 2023-11-14 | End: 2023-11-21

## 2023-11-14 RX ADMIN — Medication 3 ML: at 16:07

## 2023-11-14 ASSESSMENT — PAIN SCALES - GENERAL
PAINLEVEL_OUTOF10: 0
PAINLEVEL_OUTOF10: 5

## 2023-11-14 ASSESSMENT — PAIN DESCRIPTION - ORIENTATION: ORIENTATION: LEFT

## 2023-11-14 ASSESSMENT — PAIN DESCRIPTION - LOCATION: LOCATION: HAND

## 2023-11-14 ASSESSMENT — PAIN - FUNCTIONAL ASSESSMENT: PAIN_FUNCTIONAL_ASSESSMENT: 0-10

## 2023-11-14 NOTE — ED NOTES
Laceration dressed with bulkee gauze. Instructed to not remove dressing for 24 hours.       Marylee Shillings, RN  11/14/23 [id number]

## 2023-11-14 NOTE — CARE COORDINATION
11/14/23 Per Rosario pt accepted by [facility] @ [phone]. Est SOC: 11/19/23. MD , pt, & son Michael Show) informed.

## 2023-11-14 NOTE — CARE COORDINATION
11/14/23 CM asked to see pt for [facility] d/t wound to right hand & therapy need. CM met with pt & son Mary Jane Mann) & son signed Choice Letter - referral sent Alyssa Funez. [facility]  PCP is Cristian Cleaning.

## 2023-11-14 NOTE — ED TRIAGE NOTES
Patient has an electric wheelchair and ran into the door frame & has skin tear to L hand area. Hand wrapped during triage.

## 2023-11-21 ENCOUNTER — HOSPITAL ENCOUNTER (EMERGENCY)
Facility: HOSPITAL | Age: 88
Discharge: HOME | End: 2023-11-21
Payer: MEDICARE

## 2023-11-21 VITALS
DIASTOLIC BLOOD PRESSURE: 56 MMHG | RESPIRATION RATE: 16 BRPM | TEMPERATURE: 98 F | HEIGHT: 56 IN | BODY MASS INDEX: 26.77 KG/M2 | OXYGEN SATURATION: 97 % | HEART RATE: 70 BPM | WEIGHT: 119 LBS | SYSTOLIC BLOOD PRESSURE: 123 MMHG

## 2023-11-21 DIAGNOSIS — T14.8XXD ENCOUNTER FOR RE-CHECK OF LACERATION WOUND: Primary | ICD-10-CM

## 2023-11-21 PROCEDURE — 99282 EMERGENCY DEPT VISIT SF MDM: CPT

## 2023-11-21 ASSESSMENT — PAIN - FUNCTIONAL ASSESSMENT: PAIN_FUNCTIONAL_ASSESSMENT: NONE - DENIES PAIN

## 2023-11-21 NOTE — ED PROVIDER NOTES
[facility] EMERGENCY DEPT  EMERGENCY DEPARTMENT HISTORY AND PHYSICAL EXAM      Date: 11/21/2023  Patient Name: Smiley Singh  MRN: [id number]  YOB: 1927  Date of evaluation: 11/21/2023  Provider: Josh Loco PA-C   Note Started: 9:51 AM EST 11/21/23    HISTORY OF PRESENT ILLNESS     Chief Complaint   Patient presents with    Suture / Staple Removal       History Provided By: Patient    HPI: Smiley Singh is a 80 y.o. female with a past medical history as listed below who presents this ED with CC of suture removal.  Patient sustained laceration/skin tear to left hand 1 week ago. Had 8 sutures placed at that time and returns for suture removal.  Patient has no new complaints or concerns at time of evaluation. No fevers or chills. Denies any drainage from wound.     PAST MEDICAL HISTORY   Past Medical History:  Past Medical History:   Diagnosis Date    Arrhythmia     H/O A-FIB    Arthritis     Cancer ([address])     h/o skin cancer on neck, forehead, L and R leg    CKD (chronic kidney disease), stage II     Corkscrew esophagus     GERD (gastroesophageal reflux disease)     Heart failure ([address])     Hyperlipidemia     Hypertension     Hypothyroidism     Ill-defined condition 2015    RIGHT EAR TUBE MILD HEARING LOSS    Sleep apnea     WEARS GUARD       Past Surgical History:  Past Surgical History:   Procedure Laterality Date    GI      1983 gallbladder removed    GYN      D&C    GYN      tubal ligation 1961    GYN      2009 ovary cyst removed    HEENT      2012 L eye cataract removed    HYSTERECTOMY (CERVIX STATUS UNKNOWN)  2009    ORTHOPEDIC SURGERY      R foot surgery    OTHER SURGICAL HISTORY      1991 plastic surgery for L and R leg skin cancer    OTHER SURGICAL HISTORY      1992 cystocele and rectocele    OTHER SURGICAL HISTORY      1984 neck cyst removed    PACEMAKER      UROLOGICAL SURGERY      bladder lift 1961       Family History:  Family History   Problem Relation Age of Onset    Hypertension

## 2023-12-26 ENCOUNTER — OFFICE VISIT (OUTPATIENT)
Age: 88
End: 2023-12-26
Payer: MEDICARE

## 2023-12-26 VITALS
BODY MASS INDEX: 28.12 KG/M2 | HEIGHT: 56 IN | SYSTOLIC BLOOD PRESSURE: 126 MMHG | HEART RATE: 74 BPM | OXYGEN SATURATION: 99 % | WEIGHT: 125 LBS | DIASTOLIC BLOOD PRESSURE: 64 MMHG

## 2023-12-26 DIAGNOSIS — H61.23 BILATERAL IMPACTED CERUMEN: ICD-10-CM

## 2023-12-26 DIAGNOSIS — H90.3 SENSORINEURAL HEARING LOSS (SNHL) OF BOTH EARS: Primary | ICD-10-CM

## 2023-12-26 PROCEDURE — G8427 DOCREV CUR MEDS BY ELIG CLIN: HCPCS | Performed by: NURSE PRACTITIONER

## 2023-12-26 PROCEDURE — 99213 OFFICE O/P EST LOW 20 MIN: CPT | Performed by: NURSE PRACTITIONER

## 2023-12-26 PROCEDURE — 1123F ACP DISCUSS/DSCN MKR DOCD: CPT | Performed by: NURSE PRACTITIONER

## 2023-12-26 PROCEDURE — G8484 FLU IMMUNIZE NO ADMIN: HCPCS | Performed by: NURSE PRACTITIONER

## 2023-12-26 PROCEDURE — 1090F PRES/ABSN URINE INCON ASSESS: CPT | Performed by: NURSE PRACTITIONER

## 2023-12-26 PROCEDURE — 1036F TOBACCO NON-USER: CPT | Performed by: NURSE PRACTITIONER

## 2023-12-26 PROCEDURE — G8419 CALC BMI OUT NRM PARAM NOF/U: HCPCS | Performed by: NURSE PRACTITIONER

## 2023-12-26 NOTE — PROGRESS NOTES
Subjective:    Floridalma Gonzalez   80 y.o.   3/30/1927     Followup Visit  This is a 80 y.o. female who is here today to discuss issues with her ears. Location - bilateral ears     Quality - impacted cerumen     Severity -  mild    Duration - unknown    Timing - constant    Context - she states se was seen by her audiologist and was told she needed her ears cleaned before they could service her hearing aids. Modifying Features - none    Associated symptoms/signs - decreased hearing     12/26/2023- Patient returns to the office today for her 6 month ear cleaning. Review of Systems  Review of Systems   Constitutional: Negative. Eyes: Negative. Respiratory:  Negative for cough, chest tightness and shortness of breath. Cardiovascular:  Negative for chest pain and palpitations. Gastrointestinal:  Negative for abdominal pain, diarrhea, nausea and vomiting. Endocrine: Negative. Genitourinary: Negative. Musculoskeletal:  Positive for arthralgias, myalgias and neck pain. Skin: Negative. Allergic/Immunologic: Negative. Neurological: Negative. Hematological: Negative. Psychiatric/Behavioral: Negative. Past Medical History:   Diagnosis Date    Arrhythmia     H/O A-FIB    Arthritis     Cancer ([address])     h/o skin cancer on neck, forehead, L and R leg    CKD (chronic kidney disease), stage II     Corkscrew esophagus     GERD (gastroesophageal reflux disease)     Heart failure (HCC)     Hyperlipidemia     Hypertension     Hypothyroidism     Ill-defined condition 2015    RIGHT EAR TUBE MILD HEARING LOSS    Sleep apnea     WEARS GUARD     Prior to Admission medications    Medication Sig Start Date End Date Taking?  Authorizing Provider   Zinc Oxide 10 % OINT Apply 1 application topically every 8 hours as needed (Irritation) 7/1/23   No Ilfeld, DO   aspirin 81 MG chewable tablet Take 81 mg by mouth daily 3/18/15   Automatic Reconciliation, Ar   Cholecalciferol 75 MCG (3000

## (undated) DEVICE — TRAY PREP DRY W/ PREM GLV 2 APPL 6 SPNG 2 UNDPD 1 OVERWRAP

## (undated) DEVICE — 1200 GUARD II KIT W/5MM TUBE W/O VAC TUBE: Brand: GUARDIAN

## (undated) DEVICE — COVER,MAYO STAND,STERILE: Brand: MEDLINE

## (undated) DEVICE — BLADE OPHTH MINI BEAV SHRP --

## (undated) DEVICE — WIPE 400300 MEROCEL 20PK INSTRUMENT: Brand: MEROCEL®

## (undated) DEVICE — SURGICAL PROCEDURE PACK BASIN MAJ SET CUST NO CAUT

## (undated) DEVICE — INFECTION CONTROL KIT SYS

## (undated) DEVICE — SUTURE PLN GUT SZ 5-0 L18IN ABSRB YELLOWISH TAN L13MM PC-1 1915G

## (undated) DEVICE — KENDALL SCD EXPRESS SLEEVES, KNEE LENGTH, MEDIUM: Brand: KENDALL SCD

## (undated) DEVICE — 1010 S-DRAPE TOWEL DRAPE 10/BX: Brand: STERI-DRAPE™

## (undated) DEVICE — GOWN,SIRUS,NONRNF,SETINSLV,XL,20/CS: Brand: MEDLINE

## (undated) DEVICE — COTTON BALLS: Brand: DEROYAL

## (undated) DEVICE — SYR IRR BLB 2OZ DISP BLU STRL -- CONVERT TO ITEM 357637

## (undated) DEVICE — SYR 10ML CTRL LR LCK NSAF LF --

## (undated) DEVICE — SYR LR LCK 1ML GRAD NSAF 30ML --

## (undated) DEVICE — SOLUTION IRRIG 1000ML H2O STRL BLT

## (undated) DEVICE — STERILE POLYISOPRENE POWDER-FREE SURGICAL GLOVES WITH EMOLLIENT COATING: Brand: PROTEXIS

## (undated) DEVICE — MASTISOL ADHESIVE LIQ 2/3ML

## (undated) DEVICE — 40418 TRENDELENBURG ONE-STEP ARM PROTECTORS LARGE (1 PAIR): Brand: 40418 TRENDELENBURG ONE-STEP ARM PROTECTORS LARGE (1 PAIR)

## (undated) DEVICE — DRAPE MICSCP W46XL120IN FOR ZEISS MD

## (undated) DEVICE — BIPOLAR FORCEPS CORD: Brand: VALLEYLAB

## (undated) DEVICE — HANDLE LT SNAP ON ULT DURABLE LENS FOR TRUMPF ALC DISPOSABLE

## (undated) DEVICE — NEEDLE HYPO 25GA L1.5IN BLU POLYPR HUB S STL REG BVL STR

## (undated) DEVICE — BLADE TYMPLSTY W2.5MM 60DEG SHRP ALL ARND BVL DN

## (undated) DEVICE — PACK,EENT,TURBAN DRAPE,PK II: Brand: MEDLINE

## (undated) DEVICE — SOLUTION IV 1000ML 0.9% SOD CHL

## (undated) DEVICE — DEVON™ KNEE AND BODY STRAP 60" X 3" (1.5 M X 7.6 CM): Brand: DEVON

## (undated) DEVICE — TUBING SUCT MASTOID TWO IRRIG SPIK ALL IN 1 LTWT FLX LEVIN